# Patient Record
Sex: FEMALE | Race: WHITE | HISPANIC OR LATINO | Employment: UNEMPLOYED | URBAN - METROPOLITAN AREA
[De-identification: names, ages, dates, MRNs, and addresses within clinical notes are randomized per-mention and may not be internally consistent; named-entity substitution may affect disease eponyms.]

---

## 2017-07-19 ENCOUNTER — HOSPITAL ENCOUNTER (EMERGENCY)
Facility: HOSPITAL | Age: 77
Discharge: HOME | End: 2017-07-19
Attending: EMERGENCY MEDICINE

## 2017-07-19 VITALS
DIASTOLIC BLOOD PRESSURE: 66 MMHG | RESPIRATION RATE: 20 BRPM | SYSTOLIC BLOOD PRESSURE: 147 MMHG | HEART RATE: 82 BPM | TEMPERATURE: 98 F | WEIGHT: 82 LBS | BODY MASS INDEX: 15.48 KG/M2 | OXYGEN SATURATION: 98 % | HEIGHT: 61 IN

## 2017-07-19 DIAGNOSIS — R07.9 CHEST PAIN: ICD-10-CM

## 2017-07-19 DIAGNOSIS — K04.01 PULPITIS: ICD-10-CM

## 2017-07-19 DIAGNOSIS — R07.89 NON-CARDIAC CHEST PAIN: ICD-10-CM

## 2017-07-19 DIAGNOSIS — R68.84 JAW PAIN: Primary | ICD-10-CM

## 2017-07-19 LAB
ALBUMIN SERPL BCP-MCNC: 4 G/DL
ALP SERPL-CCNC: 116 U/L
ALT SERPL W/O P-5'-P-CCNC: 9 U/L
ANION GAP SERPL CALC-SCNC: 10 MMOL/L
AST SERPL-CCNC: 13 U/L
BASOPHILS # BLD AUTO: 0.01 K/UL
BASOPHILS NFR BLD: 0.1 %
BILIRUB SERPL-MCNC: 0.3 MG/DL
BNP SERPL-MCNC: 18 PG/ML
BUN SERPL-MCNC: 17 MG/DL
CALCIUM SERPL-MCNC: 9.7 MG/DL
CHLORIDE SERPL-SCNC: 98 MMOL/L
CO2 SERPL-SCNC: 26 MMOL/L
CREAT SERPL-MCNC: 0.8 MG/DL
DIFFERENTIAL METHOD: ABNORMAL
EOSINOPHIL # BLD AUTO: 0 K/UL
EOSINOPHIL NFR BLD: 0.1 %
ERYTHROCYTE [DISTWIDTH] IN BLOOD BY AUTOMATED COUNT: 11.8 %
EST. GFR  (AFRICAN AMERICAN): >60 ML/MIN/1.73 M^2
EST. GFR  (NON AFRICAN AMERICAN): >60 ML/MIN/1.73 M^2
GLUCOSE SERPL-MCNC: 217 MG/DL
GLUCOSE SERPL-MCNC: 222 MG/DL (ref 70–110)
HCT VFR BLD AUTO: 37.3 %
HGB BLD-MCNC: 12.9 G/DL
LYMPHOCYTES # BLD AUTO: 1.3 K/UL
LYMPHOCYTES NFR BLD: 18.7 %
MCH RBC QN AUTO: 30.8 PG
MCHC RBC AUTO-ENTMCNC: 34.6 %
MCV RBC AUTO: 89 FL
MONOCYTES # BLD AUTO: 0.4 K/UL
MONOCYTES NFR BLD: 5.4 %
NEUTROPHILS # BLD AUTO: 5.3 K/UL
NEUTROPHILS NFR BLD: 75.7 %
PLATELET # BLD AUTO: 278 K/UL
PMV BLD AUTO: 10.4 FL
POTASSIUM SERPL-SCNC: 4.1 MMOL/L
PROT SERPL-MCNC: 8.5 G/DL
RBC # BLD AUTO: 4.19 M/UL
SODIUM SERPL-SCNC: 134 MMOL/L
TROPONIN I SERPL DL<=0.01 NG/ML-MCNC: 0.01 NG/ML
TROPONIN I SERPL DL<=0.01 NG/ML-MCNC: <0.006 NG/ML
WBC # BLD AUTO: 7.06 K/UL

## 2017-07-19 PROCEDURE — 93010 ELECTROCARDIOGRAM REPORT: CPT | Mod: ,,, | Performed by: INTERNAL MEDICINE

## 2017-07-19 PROCEDURE — 84484 ASSAY OF TROPONIN QUANT: CPT | Mod: 91

## 2017-07-19 PROCEDURE — 93005 ELECTROCARDIOGRAM TRACING: CPT

## 2017-07-19 PROCEDURE — 83880 ASSAY OF NATRIURETIC PEPTIDE: CPT

## 2017-07-19 PROCEDURE — 99284 EMERGENCY DEPT VISIT MOD MDM: CPT | Mod: 25

## 2017-07-19 PROCEDURE — 96374 THER/PROPH/DIAG INJ IV PUSH: CPT

## 2017-07-19 PROCEDURE — 25000003 PHARM REV CODE 250: Performed by: EMERGENCY MEDICINE

## 2017-07-19 PROCEDURE — 80053 COMPREHEN METABOLIC PANEL: CPT

## 2017-07-19 PROCEDURE — 85025 COMPLETE CBC W/AUTO DIFF WBC: CPT

## 2017-07-19 RX ORDER — GLIPIZIDE 5 MG/1
5 TABLET, FILM COATED, EXTENDED RELEASE ORAL
COMMUNITY

## 2017-07-19 RX ORDER — PENICILLIN V POTASSIUM 500 MG/1
500 TABLET, FILM COATED ORAL 4 TIMES DAILY
Qty: 40 TABLET | Refills: 0 | Status: SHIPPED | OUTPATIENT
Start: 2017-07-19 | End: 2017-07-26

## 2017-07-19 RX ORDER — OXYCODONE AND ACETAMINOPHEN 5; 325 MG/1; MG/1
1 TABLET ORAL
Status: COMPLETED | OUTPATIENT
Start: 2017-07-19 | End: 2017-07-19

## 2017-07-19 RX ORDER — ASPIRIN 325 MG
325 TABLET ORAL
Status: COMPLETED | OUTPATIENT
Start: 2017-07-19 | End: 2017-07-19

## 2017-07-19 RX ORDER — DILTIAZEM HYDROCHLORIDE 90 MG/1
90 TABLET, FILM COATED ORAL 4 TIMES DAILY
COMMUNITY

## 2017-07-19 RX ORDER — DEXTROMETHORPHAN HYDROBROMIDE, GUAIFENESIN 5; 100 MG/5ML; MG/5ML
650 LIQUID ORAL EVERY 12 HOURS PRN
Refills: 0
Start: 2017-07-19 | End: 2017-07-19 | Stop reason: ALTCHOICE

## 2017-07-19 RX ORDER — OXYCODONE AND ACETAMINOPHEN 5; 325 MG/1; MG/1
1 TABLET ORAL EVERY 4 HOURS PRN
Qty: 8 TABLET | Refills: 0 | Status: SHIPPED | OUTPATIENT
Start: 2017-07-19 | End: 2017-07-20

## 2017-07-19 RX ORDER — LABETALOL HYDROCHLORIDE 5 MG/ML
10 INJECTION, SOLUTION INTRAVENOUS
Status: COMPLETED | OUTPATIENT
Start: 2017-07-19 | End: 2017-07-19

## 2017-07-19 RX ORDER — LIDOCAINE HYDROCHLORIDE 20 MG/ML
5 SOLUTION OROPHARYNGEAL
Status: COMPLETED | OUTPATIENT
Start: 2017-07-19 | End: 2017-07-19

## 2017-07-19 RX ORDER — NITROGLYCERIN 0.4 MG/1
0.4 TABLET SUBLINGUAL
Status: DISCONTINUED | OUTPATIENT
Start: 2017-07-19 | End: 2017-07-19 | Stop reason: HOSPADM

## 2017-07-19 RX ADMIN — LABETALOL HYDROCHLORIDE 10 MG: 5 INJECTION, SOLUTION INTRAVENOUS at 12:07

## 2017-07-19 RX ADMIN — ASPIRIN 325 MG ORAL TABLET 325 MG: 325 PILL ORAL at 11:07

## 2017-07-19 RX ADMIN — LIDOCAINE HYDROCHLORIDE 5 ML: 20 SOLUTION ORAL; TOPICAL at 02:07

## 2017-07-19 RX ADMIN — OXYCODONE HYDROCHLORIDE AND ACETAMINOPHEN 1 TABLET: 5; 325 TABLET ORAL at 12:07

## 2017-07-19 NOTE — ED TRIAGE NOTES
"Pt arrived via personal transportation from home. CC of chest pain. Pt stated "I get this pain in the left side of my face, it moves to the back of my head and back to my chest." pt presents with intermittent headaches that radiate to her left chest and shoulder. Pt denies N/V/F/D/SOB. NAD at this time.  "

## 2017-07-19 NOTE — ED PROVIDER NOTES
Encounter Date: 7/19/2017    SCRIBE #1 NOTE: I, Nathan Cole, am scribing for, and in the presence of,  José Miguel Hillman MD. I have scribed the following portions of the note - Other sections scribed: HPI and ROS.       History     Chief Complaint   Patient presents with    Chest Pain     Pt. presents with chest pain that began yesterday. Pt. describes the pain as beginning to left side of chest and radiating up the left side of neck and to jaw.      CC: Jaw Pain     HPI: This 77 y.o F with DM and HTN presents to the ED c/o acute onset of intermittent L side lower jaw pain which radiates up x1 day. The pt's episodes of pain last for a few seconds. The pt was seen by a dentist yesterday, and was told her exam was normal. The pt reports similar episodes 8 years ago, which was attributed to nerve pain. The the pt denies cardiac hx, diaphoresis, chest pain, fever and chills. No prior tx. Reports jaw pain with radiate down face to chest and upper to her left temple. Pain is sharp.       The history is provided by the patient. No  was used.     Review of patient's allergies indicates:  No Known Allergies  Past Medical History:   Diagnosis Date    Diabetes mellitus     Hypertension      History reviewed. No pertinent surgical history.  History reviewed. No pertinent family history.  Social History   Substance Use Topics    Smoking status: Never Smoker    Smokeless tobacco: Never Used    Alcohol use No     Review of Systems   Constitutional: Negative for chills, diaphoresis and fever.   HENT: Negative for hearing loss, sore throat and tinnitus.         (+) L side jaw pain   Eyes: Negative for pain and redness.   Respiratory: Negative for shortness of breath.    Cardiovascular: Negative for chest pain.   Gastrointestinal: Negative for nausea.   Genitourinary: Negative for dysuria, hematuria and urgency.   Musculoskeletal: Negative for back pain and neck pain.   Skin: Negative for rash.    Neurological: Positive for headaches. Negative for weakness.   Hematological: Does not bruise/bleed easily.       Physical Exam     Initial Vitals [07/19/17 0947]   BP Pulse Resp Temp SpO2   (!) 170/70 103 17 99 °F (37.2 °C) 98 %      MAP       103.33         Physical Exam    Vitals reviewed.  Constitutional: She appears well-developed and well-nourished.   Will occasionally grab her face and complain of momentary jaw pain and hold her left jaw.    HENT:   Head: Normocephalic and atraumatic.   Nose: Nose normal.   Mouth/Throat: Uvula is midline and oropharynx is clear and moist. She does not have dentures. No oral lesions. No trismus in the jaw. Abnormal dentition. Dental caries present. No dental abscesses or uvula swelling. No oropharyngeal exudate.       Eyes: EOM are normal. Pupils are equal, round, and reactive to light.   Neck: Normal range of motion. Neck supple. No JVD present.   Cardiovascular: Regular rhythm and normal heart sounds. Exam reveals no gallop and no friction rub.    No murmur heard.  Pulmonary/Chest: Breath sounds normal. No stridor. No respiratory distress. She has no wheezes. She has no rhonchi. She has no rales.   Abdominal: Soft. Bowel sounds are normal. She exhibits no distension and no mass. There is no tenderness. There is no rebound and no guarding.   Musculoskeletal: Normal range of motion. She exhibits no edema or tenderness.   Neurological: She is alert and oriented to person, place, and time. She has normal strength. No sensory deficit.   Skin: Skin is warm and dry.   Psychiatric: She has a normal mood and affect. Thought content normal.         ED Course   Procedures  Labs Reviewed   CBC W/ AUTO DIFFERENTIAL - Abnormal; Notable for the following:        Result Value    Gran% 75.7 (*)     All other components within normal limits   COMPREHENSIVE METABOLIC PANEL - Abnormal; Notable for the following:     Sodium 134 (*)     Glucose 217 (*)     Total Protein 8.5 (*)     ALT 9 (*)      All other components within normal limits   TROPONIN I   B-TYPE NATRIURETIC PEPTIDE   TROPONIN I     EKG Readings: (Independently Interpreted)   Initial Reading: No STEMI. Rhythm: Sinus Tachycardia. Ectopy: No Ectopy. Conduction: Normal. ST Segments: Normal ST Segments. T Waves: Normal. Clinical Impression: Normal Sinus Rhythm       X-Rays:   Independently Interpreted Readings:   Chest X-Ray: Normal heart size.  No infiltrates.  No acute abnormalities.          Medical decision-making:    The patient received a medical screening exam. If performed, the EKG was independently evaluated by me and is pending final cardiology evaluation.  If performed, all radiographic studies were independently evaluated by me and are pending final radiology evaluation. If labs were ordered, they were reviewed. Vital signs are independently assessed by me.  If performed, the pulse oximetry was independently evaluated by me.  I decided to obtain the patient's past medical record.  If available, I reviewed the patient's past medical record, including most recent labs and radiology reports.    This is an emergent evaluation for a patient with jaw pain that sometimes goes down into the chest..The patient's pain is atypical for cardiac etiology.  I decided to obtain and review the patient's past medical record.      Pt has very poor dentition. Pt likely has acute pulpitis. There is severe erosion, dental caries and missing teeth.  There is no sign of odontogenic abscess or fluctuant mass.  Patient is able to fully range her jaw.  Doubt septic jaw.  Doubt deep space infection.  No sign of epiglottitis or strep pharyngitis.  No tracheitis.  I will have the patient follow-up with dental clinic.  We'll start on antibiotics and pain medications.    The vital signs are stable in the room.    EKG is normal.  There is no evidence of STEMI or ischemia.    CXR is negative for pneumonia, pneumothorax and edema.  Troponin is negative x 2 and was  drawn at least 8 hours since the onset of pain.  I doubt ACS.  BNP is negative.  There is no evidence of congestive heart failure.  The electrolytes are relatively normal.  The pt is not anemic.      The pt's symptoms were treated with:    Medications   aspirin tablet 325 mg (325 mg Oral Given 7/19/17 1103)   oxycodone-acetaminophen 5-325 mg per tablet 1 tablet (1 tablet Oral Given 7/19/17 1216)   labetalol injection 10 mg (10 mg Intravenous Given 7/19/17 1240)   lidocaine HCl 2% oral solution 5 mL (5 mLs Oral Given 7/19/17 1435)       Currently the patient has a a non-diagnostic EKG with negative troponin in the emergency department.  I doubt acute coronary syndrome.  I did inform the patient that even with negative testing, we can never eliminate all risk.  I believe the patient is low risk with negative initial testing; they are appropriate for close outpatient follow-up.  The patient is aware of the small but persistent risk for MI/ACS with subsequent cardiac complications or death.  I have low suspicion for cardiopulmonary, vascular, infectious, respiratory, or other emergent medical condition based on my evaluation in the ED.     The patient's pain is currently improved.      The results and physical exam findings were reviewed with the patient. Pt agrees with assessment, disposition and treatment plan and has no further questions or complaints at this time.      LAURA Hillman M.D. 7:13 PM 7/19/2017         Scribe Attestation:   Scribe #1: I performed the above scribed service and the documentation accurately describes the services I performed. I attest to the accuracy of the note.    Attending Attestation:           Physician Attestation for Scribe:  Physician Attestation Statement for Scribe #1: I, José Miguel Plata MD, reviewed documentation, as scribed by Nathan Cole in my presence, and it is both accurate and complete.                 ED Course     Clinical Impression:   The primary encounter  diagnosis was Jaw pain. Diagnoses of Chest pain, Pulpitis, and Non-cardiac chest pain were also pertinent to this visit.                           José Miguel Hillman MD  07/19/17 4473

## 2017-07-20 ENCOUNTER — HOSPITAL ENCOUNTER (EMERGENCY)
Facility: HOSPITAL | Age: 77
Discharge: HOME | End: 2017-07-20
Attending: EMERGENCY MEDICINE

## 2017-07-20 VITALS
HEART RATE: 100 BPM | SYSTOLIC BLOOD PRESSURE: 170 MMHG | OXYGEN SATURATION: 97 % | WEIGHT: 83 LBS | DIASTOLIC BLOOD PRESSURE: 80 MMHG | TEMPERATURE: 99 F | BODY MASS INDEX: 16.73 KG/M2 | RESPIRATION RATE: 18 BRPM | HEIGHT: 59 IN

## 2017-07-20 DIAGNOSIS — G51.8 FACIAL NEURALGIA: Primary | ICD-10-CM

## 2017-07-20 PROCEDURE — 99283 EMERGENCY DEPT VISIT LOW MDM: CPT

## 2017-07-20 RX ORDER — PREGABALIN 75 MG/1
75 CAPSULE ORAL 2 TIMES DAILY
Qty: 20 CAPSULE | Refills: 0 | Status: SHIPPED | OUTPATIENT
Start: 2017-07-20 | End: 2017-07-30

## 2017-07-20 NOTE — ED PROVIDER NOTES
Encounter Date: 7/20/2017       History     Chief Complaint   Patient presents with    Facial Pain     Left side, x2 days. Family reports patient was evaluated here yesterday and given antibiotics and pain medicaiton w/ no relief. Pt was evaluated by a dentist yesterday and told it was not a tooth. Family reports the patient had the same symptoms years ago and it was nerve pain.    Chief complaint: Facial pain  History of present illness: Patient reports 2 days of left-sided facial pain that is intermittent and shocking in character she states that nothing helps states that the pain does not radiate and is a 10 over 10 she states that putting pressure on the pain decreases pain slightly is in intense pain and hurts worse when she opens her mouth even slightly.  She had the pain 8 years previously and was told than it was a nerve in the face that made the pain happened.        The history is provided by the patient and a relative. The history is limited by a language barrier. A  was used.     Review of patient's allergies indicates:  No Known Allergies  Past Medical History:   Diagnosis Date    Diabetes mellitus     Hypertension      History reviewed. No pertinent surgical history.  History reviewed. No pertinent family history.  Social History   Substance Use Topics    Smoking status: Never Smoker    Smokeless tobacco: Never Used    Alcohol use No     Review of Systems   Constitutional: Negative for chills, fatigue and fever.   HENT: Negative for congestion, ear discharge, ear pain, postnasal drip, rhinorrhea, sinus pressure, sneezing, sore throat and voice change.    Eyes: Negative for discharge and itching.   Respiratory: Negative for cough, shortness of breath and wheezing.    Cardiovascular: Negative for chest pain, palpitations and leg swelling.   Gastrointestinal: Negative for abdominal pain, constipation, diarrhea, nausea and vomiting.   Endocrine: Negative for polydipsia, polyphagia  and polyuria.   Genitourinary: Negative for dysuria, frequency, hematuria, urgency, vaginal bleeding, vaginal discharge and vaginal pain.   Musculoskeletal: Negative for arthralgias and myalgias.   Skin: Negative for rash and wound.   Neurological: Negative for dizziness, seizures, syncope, weakness and numbness.   Hematological: Negative for adenopathy. Does not bruise/bleed easily.   Psychiatric/Behavioral: Negative for self-injury and suicidal ideas. The patient is not nervous/anxious.        Physical Exam     Initial Vitals [07/20/17 1720]   BP Pulse Resp Temp SpO2   (!) 170/80 100 18 98.8 °F (37.1 °C) 97 %      MAP       110         Physical Exam    Nursing note and vitals reviewed.  Constitutional: She appears well-developed and well-nourished.   HENT:   Head: Normocephalic and atraumatic.   Right Ear: External ear normal.   Left Ear: External ear normal.   Nose: Nose normal.   Eyes: Conjunctivae and EOM are normal. Pupils are equal, round, and reactive to light. Right eye exhibits no discharge. Left eye exhibits no discharge.   Neck: Normal range of motion.   Abdominal: She exhibits no distension.   Musculoskeletal: Normal range of motion.   Neurological: She is alert and oriented to person, place, and time. She has normal strength. No cranial nerve deficit or sensory deficit. GCS eye subscore is 4. GCS verbal subscore is 5. GCS motor subscore is 6.   Skin: Skin is dry. Capillary refill takes less than 2 seconds.         ED Course   Procedures  Labs Reviewed - No data to display          Medical Decision Making:   Initial Assessment:   77-year-old  female visiting with a 2 day history of left-sided neurogenic pain  Differential Diagnosis:   Trigeminal neuralgia, facial neuralgia, dental pain  ED Management:  Following a thorough history and physical, the patient was diagnosed with neuralgia discharged home with Lyrica 75 mg twice a day for neurogenic pain and told to follow-up with her primary care  provider who will continue this prescription if it proves ineffective.  If it does not her primary care provider can prescribe Tegretol and follow up with her for carry through on that prescription.  Other:   I have discussed this case with another health care provider.       <> Summary of the Discussion: Care of the patient discussed with Dr. Navarro who agreed with the assessment and plan.                       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    Disposition:   Disposition: Discharged  Condition: Stable                        Ward Keller, JERICA  07/20/17 1846

## 2017-07-20 NOTE — DISCHARGE INSTRUCTIONS
Follow up with your doctor asap to continue your new medication if it helps, consider a neurology consult.  Return to the Emergency department for any worsening or failure to improve, otherwise follow up with your primary care provider.

## 2017-07-20 NOTE — ED TRIAGE NOTES
Left sided facial pain for 2 days seen here yesterday  Also saw dentist yesterday  Still having discomfort

## 2017-07-22 LAB — POCT GLUCOSE: 222 MG/DL (ref 70–110)

## 2022-08-11 ENCOUNTER — OFFICE VISIT (OUTPATIENT)
Dept: URGENT CARE | Facility: CLINIC | Age: 82
End: 2022-08-11

## 2022-08-11 VITALS
DIASTOLIC BLOOD PRESSURE: 67 MMHG | SYSTOLIC BLOOD PRESSURE: 166 MMHG | BODY MASS INDEX: 14.72 KG/M2 | HEART RATE: 104 BPM | WEIGHT: 73 LBS | HEIGHT: 59 IN | OXYGEN SATURATION: 95 % | TEMPERATURE: 99 F | RESPIRATION RATE: 18 BRPM

## 2022-08-11 DIAGNOSIS — M85.80 OSTEOPENIA, UNSPECIFIED LOCATION: ICD-10-CM

## 2022-08-11 DIAGNOSIS — R81 GLUCOSURIA: ICD-10-CM

## 2022-08-11 DIAGNOSIS — Z76.89 ENCOUNTER TO ESTABLISH CARE: ICD-10-CM

## 2022-08-11 DIAGNOSIS — M54.9 BACK PAIN, UNSPECIFIED BACK LOCATION, UNSPECIFIED BACK PAIN LATERALITY, UNSPECIFIED CHRONICITY: ICD-10-CM

## 2022-08-11 DIAGNOSIS — M54.50 ACUTE BILATERAL LOW BACK PAIN WITHOUT SCIATICA: Primary | ICD-10-CM

## 2022-08-11 LAB
BILIRUB UR QL STRIP: NEGATIVE
GLUCOSE SERPL-MCNC: 265 MG/DL (ref 70–110)
GLUCOSE UR QL STRIP: POSITIVE
KETONES UR QL STRIP: NEGATIVE
LEUKOCYTE ESTERASE UR QL STRIP: NEGATIVE
PH, POC UA: 5
POC BLOOD, URINE: NEGATIVE
POC NITRATES, URINE: NEGATIVE
PROT UR QL STRIP: NEGATIVE
SP GR UR STRIP: 1.02 (ref 1–1.03)
UROBILINOGEN UR STRIP-ACNC: ABNORMAL (ref 0.1–1.1)

## 2022-08-11 PROCEDURE — 74019 RADEX ABDOMEN 2 VIEWS: CPT | Mod: TIER,S$GLB,, | Performed by: RADIOLOGY

## 2022-08-11 PROCEDURE — 81003 URINALYSIS AUTO W/O SCOPE: CPT | Mod: QW,TIER,S$GLB, | Performed by: FAMILY MEDICINE

## 2022-08-11 PROCEDURE — 82962 POCT GLUCOSE, HAND-HELD DEVICE: ICD-10-PCS | Mod: TIER,S$GLB,, | Performed by: FAMILY MEDICINE

## 2022-08-11 PROCEDURE — 81003 POCT URINALYSIS, DIPSTICK, AUTOMATED, W/O SCOPE: ICD-10-PCS | Mod: QW,TIER,S$GLB, | Performed by: FAMILY MEDICINE

## 2022-08-11 PROCEDURE — 74019 XR ABDOMEN FLAT AND ERECT: ICD-10-PCS | Mod: TIER,S$GLB,, | Performed by: RADIOLOGY

## 2022-08-11 PROCEDURE — 99204 PR OFFICE/OUTPT VISIT, NEW, LEVL IV, 45-59 MIN: ICD-10-PCS | Mod: TIER,S$GLB,, | Performed by: FAMILY MEDICINE

## 2022-08-11 PROCEDURE — 82962 GLUCOSE BLOOD TEST: CPT | Mod: TIER,S$GLB,, | Performed by: FAMILY MEDICINE

## 2022-08-11 PROCEDURE — 72100 X-RAY EXAM L-S SPINE 2/3 VWS: CPT | Mod: TIER,S$GLB,, | Performed by: RADIOLOGY

## 2022-08-11 PROCEDURE — 72100 XR LUMBAR SPINE 2 OR 3 VIEWS: ICD-10-PCS | Mod: TIER,S$GLB,, | Performed by: RADIOLOGY

## 2022-08-11 PROCEDURE — 99204 OFFICE O/P NEW MOD 45 MIN: CPT | Mod: TIER,S$GLB,, | Performed by: FAMILY MEDICINE

## 2022-08-11 RX ORDER — LIDOCAINE 50 MG/G
1 PATCH TOPICAL DAILY
Qty: 7 PATCH | Refills: 0 | Status: SHIPPED | OUTPATIENT
Start: 2022-08-11 | End: 2022-08-18

## 2022-08-11 RX ORDER — DICLOFENAC SODIUM 10 MG/G
2 GEL TOPICAL 4 TIMES DAILY
Qty: 20 G | Refills: 0 | Status: SHIPPED | OUTPATIENT
Start: 2022-08-11

## 2022-08-11 NOTE — PROGRESS NOTES
"Subjective:       Patient ID: Jing Velasquez is a 82 y.o. female.    Vitals:  height is 4' 11" (1.499 m) and weight is 33.1 kg (73 lb). Her temperature is 99 °F (37.2 °C). Her blood pressure is 166/67 (abnormal) and her pulse is 104. Her respiration is 18 and oxygen saturation is 95%.     Chief Complaint: Back Pain    Pt is coming in today with lower back pain. Pain started Tuesday, unchanged.Pt caregiver states she only has been when she is moving. Pain level 10. Ibuprofen taken with mild relief.   Provider note begins below:  Language line used = [id number]  Pt with her daughter, daughter reports she gets seen by different doctors, she is currently living in [facility] with her daughter, daughter is unsure if she has a pcp, she does not have health insurance. Hx of umbilical hernia repair.  Caregiver reports she has a history of cystitis but denies any frequency, urgency, dysuria at this time.  She has more pain with movement, has comfort lying flat.  Denies injury or trauma.  Denies history of back problems in the past, does have a history of cholecystectomy.  Reports she is passing gas, last bowel movement yesterday and was normal for her.  Daughter reports she has a history of hypertension, hyper lipidemia, and diabetes.    Back Pain  This is a new problem. Episode onset: 3 days  The problem occurs constantly. The problem is unchanged. The pain is present in the gluteal. The quality of the pain is described as aching. The pain does not radiate. The pain is at a severity of 10/10. The pain is severe. The pain is the same all the time. The symptoms are aggravated by twisting, bending and position. Pertinent negatives include no abdominal pain, bladder incontinence, bowel incontinence, chest pain, dysuria, fever, headaches, leg pain, numbness, paresis, paresthesias, pelvic pain, perianal numbness, tingling, weakness or weight loss. She has tried NSAIDs for the symptoms. The treatment provided no relief. "       Constitution: Negative for activity change, appetite change, chills, sweating, fatigue, fever, unexpected weight change and generalized weakness.   Cardiovascular: Negative for chest pain.   Gastrointestinal: Negative for abdominal pain and bowel incontinence.   Genitourinary: Negative for dysuria, frequency, urgency, urine decreased, bladder incontinence and pelvic pain.   Musculoskeletal: Positive for pain and back pain. Negative for trauma, joint pain, joint swelling, abnormal ROM of joint, arthritis, gout, muscle cramps, muscle ache and history of spine disorder.   Neurological: Negative for headaches and numbness.       Objective:      Physical Exam   Constitutional: She is oriented to person, place, and time. She appears well-developed.  Non-toxic appearance. She does not appear ill. No distress.      Comments:Daughter present patient is very thin and elderly appearing.     HENT:   Head: Normocephalic and atraumatic.   Ears:   Right Ear: External ear normal.   Left Ear: External ear normal.   Nose: Nose normal.   Mouth/Throat: Oropharynx is clear and moist.   Eyes: Conjunctivae, EOM and lids are normal. Pupils are equal, round, and reactive to light.   Neck: Trachea normal and phonation normal. Neck supple.   Cardiovascular: S1 normal and S2 normal.   Pulmonary/Chest: Effort normal and breath sounds normal.   Abdominal: Soft. flat abdomen There is generalized abdominal tenderness. There is no rebound, no guarding, no tenderness at McBurney's point, negative Adams's sign, no left CVA tenderness, negative Rovsing's sign, negative psoas sign, no right CVA tenderness and negative obturator sign.   Musculoskeletal:      Lumbar back: She exhibits decreased range of motion. She exhibits no tenderness, no bony tenderness, no swelling, no edema, no deformity, no laceration and no spasm.      Comments: There is no midline spinal tenderness, she has decreased range of motion, pain with rotation, and rib and  flexion, needs assistance for ROM, needs assistance with gait, daughter helping her ambulate.    Neurological: She is alert and oriented to person, place, and time.   Skin: Skin is warm, dry, intact, not diaphoretic and no rash.   Psychiatric: Her speech is normal and behavior is normal. Judgment and thought content normal.   Nursing note and vitals reviewed.        Assessment:       1. Acute bilateral low back pain without sciatica    2. Back pain, unspecified back location, unspecified back pain laterality, unspecified chronicity    3. Glucosuria    4. Osteopenia, unspecified location    5. Encounter to establish care        Results for orders placed or performed in visit on 08/11/22   POCT Urinalysis, Dipstick, Automated, W/O Scope   Result Value Ref Range    POC Blood, Urine Negative Negative    POC Bilirubin, Urine Negative Negative    POC Urobilinogen, Urine norm 0.1 - 1.1    POC Ketones, Urine Negative Negative    POC Protein, Urine Negative Negative    POC Nitrates, Urine Negative Negative    POC Glucose, Urine Positive (A) Negative    pH, UA 5.0     POC Specific Gravity, Urine 1.020 1.003 - 1.029    POC Leukocytes, Urine Negative Negative   POCT Glucose, Hand-Held Device   Result Value Ref Range    POC Glucose 265 (A) 70 - 110 MG/DL      XR ABDOMEN FLAT AND ERECT    Result Date: 8/11/2022  EXAMINATION: XR ABDOMEN FLAT AND ERECT CLINICAL HISTORY: Low back pain, unspecified TECHNIQUE: Flat and erect AP views of the abdomen were performed. COMPARISON: None FINDINGS: Opacities at the lung bases may relate to scarring.  Left lower lung zone opacity may relate to nipple shadow.  No large volume of free air or radiographic evidence of bowel obstruction.  Stool throughout the colon may relate to constipation.  There are surgical clips in the gallbladder fossa and postop changes of the left femur.  There is an SMA stent.  There are aortoiliac calcifications.  Bones show degenerative changes.     No acute  radiographic findings in the abdomen.  Possible constipation. Bilateral lower lung zone opacities could relate to scarring.  Further evaluation could be obtained with dedicated radiograph of the chest. Electronically signed by: Jake Paulson MD Date:    08/11/2022 Time:    14:54    XR LUMBAR SPINE 2 OR 3 VIEWS    Result Date: 8/11/2022  EXAMINATION: XR LUMBAR SPINE 2 OR 3 VIEWS CLINICAL HISTORY: Low back pain, unspecified TECHNIQUE: Three views including AP, lateral and spot lateral view of the lumbosacral junction COMPARISON: None FINDINGS: Generalized osteopenia.  Mild curvature of the lumbar spine on the frontal view, convex toward the right.  Alignment appears satisfactory in the lateral projection.  Vertebral body heights and disc spaces in the lumbar region appear satisfactory.  There may be some anterior wedging of the T11 and T12 vertebral bodies.  Mild hypertrophic degenerative changes are noted.  No spondylolysis or spondylolisthesis.  No definite evidence of acute fracture or osseous destruction.  Pedicles appear intact.  Aortoiliac atherosclerosis without definite evidence of aneurysm.  Metallic stent seen in the region of the SMA.  Surgical clips at the right upper quadrant of the abdomen.     Generalized osteopenia with DJD and alignment abnormalities as detailed above.  Possible anterior wedging of the T11-T12 vertebral bodies.  Additional findings as detailed above. Electronically signed by: Ilan Gomez MD Date:    08/11/2022 Time:    14:34    3:20 PM used language line #[id number] for update and poc  She has a fu with GI 8/25 and fu with ortho upcoming as well  Sounds like she came in town from [place], she was having pain after eating and is being followed by GI for this.  Not interested in ED eval at this time.   Plan:       encouraged vitamin-D and calcium  Topical therapy, warm compress    Discussed results/diagnosis/plan with patient in clinic. Strict precautions given to patient to monitor  for worsening signs and symptoms. Advised to follow up with PCP or specialist.    Explained side effects of medications prescribed with patient and informed him/her to discontinue use if he/she has any side effects and to inform UC or PCP if this occurs. All questions answered. Strict ED verses clinic return precautions stressed and given in depth. Advised if symptoms worsens of fail to improve he/she should go to the Emergency Room. Discharge and follow-up instructions given verbally/printed with the patient who expressed understanding and willingness to comply with my recommendations. Patient voiced understanding and in agreement with current treatment plan. Patient exits the exam room in no acute distress. Conversant and engaged during discharge discussion, verbalized understanding.      Acute bilateral low back pain without sciatica  -     XR LUMBAR SPINE 2 OR 3 VIEWS; Future; Expected date: 08/11/2022  -     XR ABDOMEN FLAT AND ERECT; Future; Expected date: 08/11/2022  -     LIDOcaine (LIDODERM) 5 %; Place 1 patch onto the skin once daily. Remove & Discard patch within 12 hours or as directed by MD for 7 days  Dispense: 7 patch; Refill: 0  -     diclofenac sodium (VOLTAREN) 1 % Gel; Apply 2 g topically 4 (four) times daily.  Dispense: 20 g; Refill: 0    Back pain, unspecified back location, unspecified back pain laterality, unspecified chronicity  -     POCT Urinalysis, Dipstick, Automated, W/O Scope    Glucosuria  -     POCT Glucose, Hand-Held Device    Osteopenia, unspecified location    Encounter to establish care  -     Ambulatory referral/consult to [facility]                 Patient Instructions   General Discharge Instructions   PLEASE READ YOUR DISCHARGE INSTRUCTIONS ENTIRELY AS IT CONTAINS IMPORTANT INFORMATION.  If you were prescribed a narcotic or controlled medication, do not drive or operate heavy equipment or machinery while taking these medications.  If you were prescribed antibiotics, please  take them to completion.  You must understand that you've received an Urgent Care treatment only and that you may be released before all your medical problems are known or treated. You, the patient, will arrange for follow up care as instructed.    OVER THE COUNTER RECOMMENDATIONS/SUGGESTIONS.    Make sure to stay well hydrated.    Use Nasal Saline to mechanically move any post nasal drip from your eustachian tube or from the back of your throat.    Use warm salt water gargles to ease your throat pain. Warm salt water gargles as needed for sore throat- 1/2 tsp salt to 1 cup warm water, gargle as desired.    Use an antihistamine such as Claritin, Zyrtec or Allegra to dry you out.    Use pseudoephedrine (behind the counter) to decongest. Pseudoephedrine 30 mg up to 240 mg /day. It can raise your blood pressure and give you palpitations.    Use mucinex (guaifenesin) to break up mucous up to 2400mg/day to loosen any mucous.    The mucinex DM pill has a cough suppressant that can be sedating. It can be used at night to stop the tickle at the back of your throat.    You can use Mucinex D (it has guaifenesin and a high dose of pseudoephedrine) in the mornings to help decongest.    Use Afrin in each nare for no longer than 3 days, as it is addictive. It can also dry out your mucous membranes and cause elevated blood pressure. This is especially useful if you are flying.    Use Flonase 1-2 sprays/nostril per day. It is a local acting steroid nasal spray, if you develop a bloody nose, stop using the medication immediately.    Sometimes Nyquil at night is beneficial to help you get some rest, however it is sedating and it does have an antihistamine, and tylenol.    Honey is a natural cough suppressant that can be used.    Tylenol up to 4,000 mg a day is safe for short periods and can be used for body aches, pain, and fever. However in high doses and prolonged use it can cause liver irritation.    Ibuprofen is a non-steroidal  anti-inflammatory that can be used for body aches, pain, and fever.However it can also cause stomach irritation if over used.     Follow up with your PCP or specialty clinic as instructed in the next 2-3 days if not improved or as needed. You can call [phone] to schedule an appointment with appropriate provider.      If you condition worsens, we recommend that you receive another evaluation at the emergency room immediately or contact your primary medical clinic's after hours call service to discuss your concerns.      Please return here or go to the Emergency Department for any concerns or worsening condition.   You can also call [phone] to schedule an appointment with the appropriate provider.    Please return here or go to the Emergency Department for any concerns or worsening of condition.    Thank you for choosing [facility]!    Our goal in the Urgent Care is to always provide outstanding medical care. You may receive a survey by mail or e-mail in the next week regarding your experience today. We would greatly appreciate you completing and returning the survey. Your feedback provides us with a way to recognize our staff who provide very good care, and it helps us learn how to improve when your experience was below our aspiration of excellence.      We appreciate you trusting us with your medical care. We hope you feel better soon. We will be happy to take care of you for all of your future medical needs.    Sincerely,    QI Locke-JIM      Back Pain Discharge Instructions    Alternate heat and ice for first 48 hours then  apply heat. You may do gently stretching if tolerable.    Moist warm compresss to area several times daily.  May use a heating pad on LOW to provide heat over a towel which was dampended with warm water. DO NOT FALL ASLEEP WITH HEATING PAD ON.  Do not stay in one position to long.  When sleeping on your back place a pillow under knees to reduce tension on back.   If sleeping on your side, place pillow between knees to keep spine in better alinement.  Wear supportive shoes such as tennis shoes for support of the lower back.  Take any medication as directed.    If you were not prescribed an anti-inflammatory medication, and if you do not have any history of stomach/intestinal ulcers, or kidney disease, or are not taking a blood thinner such as Coumadin, Plavix, Pradaxa, Eloquis, or Xaralta for example, it is OK to take over the counter Ibuprofen or Advil or Motrin or Aleve as directed.  Do not take these medications on an empty stomach.    If you were prescribed a narcotic medication, do not drive or operate heavy equipment or machinery while taking these medications.    If you lose control of your bowel and/or bladder; lose sensation in between your legs by your genitalia and/or rectum or  lose control or sensation of any extremity, please go to the nearest Emergency Department immediately.

## 2022-08-11 NOTE — PATIENT INSTRUCTIONS
General Discharge Instructions   PLEASE READ YOUR DISCHARGE INSTRUCTIONS ENTIRELY AS IT CONTAINS IMPORTANT INFORMATION.  If you were prescribed a narcotic or controlled medication, do not drive or operate heavy equipment or machinery while taking these medications.  If you were prescribed antibiotics, please take them to completion.  You must understand that you've received an Urgent Care treatment only and that you may be released before all your medical problems are known or treated. You, the patient, will arrange for follow up care as instructed.    OVER THE COUNTER RECOMMENDATIONS/SUGGESTIONS.    Make sure to stay well hydrated.    Use Nasal Saline to mechanically move any post nasal drip from your eustachian tube or from the back of your throat.    Use warm salt water gargles to ease your throat pain. Warm salt water gargles as needed for sore throat- 1/2 tsp salt to 1 cup warm water, gargle as desired.    Use an antihistamine such as Claritin, Zyrtec or Allegra to dry you out.    Use pseudoephedrine (behind the counter) to decongest. Pseudoephedrine 30 mg up to 240 mg /day. It can raise your blood pressure and give you palpitations.    Use mucinex (guaifenesin) to break up mucous up to 2400mg/day to loosen any mucous.    The mucinex DM pill has a cough suppressant that can be sedating. It can be used at night to stop the tickle at the back of your throat.    You can use Mucinex D (it has guaifenesin and a high dose of pseudoephedrine) in the mornings to help decongest.    Use Afrin in each nare for no longer than 3 days, as it is addictive. It can also dry out your mucous membranes and cause elevated blood pressure. This is especially useful if you are flying.    Use Flonase 1-2 sprays/nostril per day. It is a local acting steroid nasal spray, if you develop a bloody nose, stop using the medication immediately.    Sometimes Nyquil at night is beneficial to help you get some rest, however it is sedating and it  does have an antihistamine, and tylenol.    Honey is a natural cough suppressant that can be used.    Tylenol up to 4,000 mg a day is safe for short periods and can be used for body aches, pain, and fever. However in high doses and prolonged use it can cause liver irritation.    Ibuprofen is a non-steroidal anti-inflammatory that can be used for body aches, pain, and fever.However it can also cause stomach irritation if over used.     Follow up with your PCP or specialty clinic as instructed in the next 2-3 days if not improved or as needed. You can call [phone] to schedule an appointment with appropriate provider.      If you condition worsens, we recommend that you receive another evaluation at the emergency room immediately or contact your primary medical clinic's after hours call service to discuss your concerns.      Please return here or go to the Emergency Department for any concerns or worsening condition.   You can also call [phone] to schedule an appointment with the appropriate provider.    Please return here or go to the Emergency Department for any concerns or worsening of condition.    Thank you for choosing [facility]!    Our goal in the Urgent Care is to always provide outstanding medical care. You may receive a survey by mail or e-mail in the next week regarding your experience today. We would greatly appreciate you completing and returning the survey. Your feedback provides us with a way to recognize our staff who provide very good care, and it helps us learn how to improve when your experience was below our aspiration of excellence.      We appreciate you trusting us with your medical care. We hope you feel better soon. We will be happy to take care of you for all of your future medical needs.    Sincerely,    QI Locke-JIM      Back Pain Discharge Instructions    Alternate heat and ice for first 48 hours then  apply heat. You may do gently stretching if  tolerable.    Moist warm compresss to area several times daily.  May use a heating pad on LOW to provide heat over a towel which was dampended with warm water. DO NOT FALL ASLEEP WITH HEATING PAD ON.  Do not stay in one position to long.  When sleeping on your back place a pillow under knees to reduce tension on back.  If sleeping on your side, place pillow between knees to keep spine in better alinement.  Wear supportive shoes such as tennis shoes for support of the lower back.  Take any medication as directed.    If you were not prescribed an anti-inflammatory medication, and if you do not have any history of stomach/intestinal ulcers, or kidney disease, or are not taking a blood thinner such as Coumadin, Plavix, Pradaxa, Eloquis, or Xaralta for example, it is OK to take over the counter Ibuprofen or Advil or Motrin or Aleve as directed.  Do not take these medications on an empty stomach.    If you were prescribed a narcotic medication, do not drive or operate heavy equipment or machinery while taking these medications.    If you lose control of your bowel and/or bladder; lose sensation in between your legs by your genitalia and/or rectum or  lose control or sensation of any extremity, please go to the nearest Emergency Department immediately.

## 2024-03-02 ENCOUNTER — HOSPITAL ENCOUNTER (INPATIENT)
Facility: HOSPITAL | Age: 84
LOS: 2 days | Discharge: HOME | DRG: 299 | End: 2024-03-04
Attending: STUDENT IN AN ORGANIZED HEALTH CARE EDUCATION/TRAINING PROGRAM | Admitting: STUDENT IN AN ORGANIZED HEALTH CARE EDUCATION/TRAINING PROGRAM
Payer: MEDICAID

## 2024-03-02 DIAGNOSIS — R07.9 CHEST PAIN: ICD-10-CM

## 2024-03-02 DIAGNOSIS — J18.9 PNEUMONIA OF RIGHT LUNG DUE TO INFECTIOUS ORGANISM, UNSPECIFIED PART OF LUNG: Primary | ICD-10-CM

## 2024-03-02 DIAGNOSIS — I73.9 PERIPHERAL ARTERIAL DISEASE: ICD-10-CM

## 2024-03-02 DIAGNOSIS — R10.9 ABDOMINAL PAIN, UNSPECIFIED ABDOMINAL LOCATION: ICD-10-CM

## 2024-03-02 LAB
ALBUMIN SERPL BCP-MCNC: 3.1 G/DL (ref 3.5–5.2)
ALLENS TEST: ABNORMAL
ALLENS TEST: NORMAL
ALP SERPL-CCNC: 115 U/L (ref 55–135)
ALT SERPL W/O P-5'-P-CCNC: 20 U/L (ref 10–44)
ANION GAP SERPL CALC-SCNC: 11 MMOL/L (ref 8–16)
AST SERPL-CCNC: 17 U/L (ref 10–40)
BASOPHILS # BLD AUTO: 0.01 K/UL (ref 0–0.2)
BASOPHILS NFR BLD: 0.2 % (ref 0–1.9)
BILIRUB SERPL-MCNC: 0.1 MG/DL (ref 0.1–1)
BUN SERPL-MCNC: 32 MG/DL (ref 8–23)
CALCIUM SERPL-MCNC: 9.4 MG/DL (ref 8.7–10.5)
CHLORIDE SERPL-SCNC: 95 MMOL/L (ref 95–110)
CO2 SERPL-SCNC: 26 MMOL/L (ref 23–29)
CREAT SERPL-MCNC: 0.8 MG/DL (ref 0.5–1.4)
DIFFERENTIAL METHOD BLD: ABNORMAL
EOSINOPHIL # BLD AUTO: 0.1 K/UL (ref 0–0.5)
EOSINOPHIL NFR BLD: 1.4 % (ref 0–8)
ERYTHROCYTE [DISTWIDTH] IN BLOOD BY AUTOMATED COUNT: 21.3 % (ref 11.5–14.5)
EST. GFR  (NO RACE VARIABLE): >60 ML/MIN/1.73 M^2
GLUCOSE SERPL-MCNC: 258 MG/DL (ref 70–110)
HCO3 UR-SCNC: 29.2 MMOL/L (ref 24–28)
HCT VFR BLD AUTO: 33.9 % (ref 37–48.5)
HGB BLD-MCNC: 11 G/DL (ref 12–16)
IMM GRANULOCYTES # BLD AUTO: 0.01 K/UL (ref 0–0.04)
IMM GRANULOCYTES NFR BLD AUTO: 0.2 % (ref 0–0.5)
LDH SERPL L TO P-CCNC: 1.68 MMOL/L (ref 0.5–2.2)
LYMPHOCYTES # BLD AUTO: 0.9 K/UL (ref 1–4.8)
LYMPHOCYTES NFR BLD: 20.3 % (ref 18–48)
MAGNESIUM SERPL-MCNC: 1.7 MG/DL (ref 1.6–2.6)
MCH RBC QN AUTO: 28.3 PG (ref 27–31)
MCHC RBC AUTO-ENTMCNC: 32.4 G/DL (ref 32–36)
MCV RBC AUTO: 87 FL (ref 82–98)
MONOCYTES # BLD AUTO: 0.4 K/UL (ref 0.3–1)
MONOCYTES NFR BLD: 7.9 % (ref 4–15)
NEUTROPHILS # BLD AUTO: 3.1 K/UL (ref 1.8–7.7)
NEUTROPHILS NFR BLD: 70 % (ref 38–73)
NRBC BLD-RTO: 0 /100 WBC
PCO2 BLDA: 40.1 MMHG (ref 35–45)
PH SMN: 7.47 [PH] (ref 7.35–7.45)
PLATELET # BLD AUTO: 438 K/UL (ref 150–450)
PMV BLD AUTO: 9.7 FL (ref 9.2–12.9)
PO2 BLDA: 25 MMHG (ref 40–60)
POC BE: 6 MMOL/L
POC SATURATED O2: 51 % (ref 95–100)
POC TCO2: 30 MMOL/L (ref 24–29)
POTASSIUM SERPL-SCNC: 4.7 MMOL/L (ref 3.5–5.1)
PROCALCITONIN SERPL IA-MCNC: 0.05 NG/ML
PROT SERPL-MCNC: 8.1 G/DL (ref 6–8.4)
PROVIDER CREDENTIALS: ABNORMAL
PROVIDER NOTIFIED: ABNORMAL
RBC # BLD AUTO: 3.89 M/UL (ref 4–5.4)
SAMPLE: ABNORMAL
SAMPLE: NORMAL
SITE: ABNORMAL
SITE: NORMAL
SODIUM SERPL-SCNC: 132 MMOL/L (ref 136–145)
TROPONIN I SERPL DL<=0.01 NG/ML-MCNC: 0.01 NG/ML (ref 0–0.03)
VERBAL RESULT READBACK PERFORMED: YES
WBC # BLD AUTO: 4.44 K/UL (ref 3.9–12.7)

## 2024-03-02 PROCEDURE — 81001 URINALYSIS AUTO W/SCOPE: CPT

## 2024-03-02 PROCEDURE — 93010 ELECTROCARDIOGRAM REPORT: CPT | Mod: ,,, | Performed by: INTERNAL MEDICINE

## 2024-03-02 PROCEDURE — 80053 COMPREHEN METABOLIC PANEL: CPT

## 2024-03-02 PROCEDURE — 99900035 HC TECH TIME PER 15 MIN (STAT)

## 2024-03-02 PROCEDURE — 83605 ASSAY OF LACTIC ACID: CPT

## 2024-03-02 PROCEDURE — 84145 PROCALCITONIN (PCT): CPT

## 2024-03-02 PROCEDURE — 12000002 HC ACUTE/MED SURGE SEMI-PRIVATE ROOM

## 2024-03-02 PROCEDURE — 96361 HYDRATE IV INFUSION ADD-ON: CPT

## 2024-03-02 PROCEDURE — 83735 ASSAY OF MAGNESIUM: CPT

## 2024-03-02 PROCEDURE — 85025 COMPLETE CBC W/AUTO DIFF WBC: CPT

## 2024-03-02 PROCEDURE — 82962 GLUCOSE BLOOD TEST: CPT

## 2024-03-02 PROCEDURE — 96365 THER/PROPH/DIAG IV INF INIT: CPT

## 2024-03-02 PROCEDURE — 84484 ASSAY OF TROPONIN QUANT: CPT

## 2024-03-02 PROCEDURE — 63600175 PHARM REV CODE 636 W HCPCS

## 2024-03-02 PROCEDURE — 86803 HEPATITIS C AB TEST: CPT | Performed by: PHYSICIAN ASSISTANT

## 2024-03-02 PROCEDURE — 25000003 PHARM REV CODE 250

## 2024-03-02 PROCEDURE — 96375 TX/PRO/DX INJ NEW DRUG ADDON: CPT

## 2024-03-02 PROCEDURE — 87040 BLOOD CULTURE FOR BACTERIA: CPT | Mod: 59

## 2024-03-02 PROCEDURE — 99285 EMERGENCY DEPT VISIT HI MDM: CPT | Mod: 25

## 2024-03-02 PROCEDURE — 82803 BLOOD GASES ANY COMBINATION: CPT

## 2024-03-02 PROCEDURE — 93005 ELECTROCARDIOGRAM TRACING: CPT

## 2024-03-02 PROCEDURE — 83880 ASSAY OF NATRIURETIC PEPTIDE: CPT

## 2024-03-02 PROCEDURE — 87389 HIV-1 AG W/HIV-1&-2 AB AG IA: CPT | Performed by: PHYSICIAN ASSISTANT

## 2024-03-02 RX ORDER — ONDANSETRON HYDROCHLORIDE 2 MG/ML
4 INJECTION, SOLUTION INTRAVENOUS
Status: COMPLETED | OUTPATIENT
Start: 2024-03-02 | End: 2024-03-02

## 2024-03-02 RX ORDER — MORPHINE SULFATE 2 MG/ML
2 INJECTION, SOLUTION INTRAMUSCULAR; INTRAVENOUS
Status: COMPLETED | OUTPATIENT
Start: 2024-03-02 | End: 2024-03-02

## 2024-03-02 RX ADMIN — SODIUM CHLORIDE, POTASSIUM CHLORIDE, SODIUM LACTATE AND CALCIUM CHLORIDE 500 ML: 600; 310; 30; 20 INJECTION, SOLUTION INTRAVENOUS at 08:03

## 2024-03-02 RX ADMIN — CEFTRIAXONE 2 G: 2 INJECTION, POWDER, FOR SOLUTION INTRAMUSCULAR; INTRAVENOUS at 10:03

## 2024-03-02 RX ADMIN — MORPHINE SULFATE 2 MG: 2 INJECTION, SOLUTION INTRAMUSCULAR; INTRAVENOUS at 08:03

## 2024-03-02 RX ADMIN — ONDANSETRON 4 MG: 2 INJECTION INTRAMUSCULAR; INTRAVENOUS at 08:03

## 2024-03-02 NOTE — Clinical Note
Diagnosis: Pneumonia of right lung due to infectious organism, unspecified part of lung [[id number]]   Future Attending Provider: IMMANUEL GROVE [[id number]]

## 2024-03-03 PROBLEM — E46 PROTEIN-CALORIE MALNUTRITION: Status: ACTIVE | Noted: 2024-03-03

## 2024-03-03 PROBLEM — E11.9 TYPE 2 DIABETES MELLITUS, WITHOUT LONG-TERM CURRENT USE OF INSULIN: Status: ACTIVE | Noted: 2024-03-03

## 2024-03-03 PROBLEM — R10.9 ABDOMINAL PAIN: Status: ACTIVE | Noted: 2024-03-03

## 2024-03-03 PROBLEM — R07.89 OTHER CHEST PAIN: Status: ACTIVE | Noted: 2024-03-03

## 2024-03-03 PROBLEM — M79.606 LOWER EXTREMITY PAIN: Status: ACTIVE | Noted: 2024-03-03

## 2024-03-03 PROBLEM — J18.9 PNEUMONIA INVOLVING RIGHT LUNG: Status: ACTIVE | Noted: 2024-03-03

## 2024-03-03 PROBLEM — I73.9 PERIPHERAL ARTERIAL DISEASE: Status: ACTIVE | Noted: 2024-03-03

## 2024-03-03 PROBLEM — I10 HYPERTENSION: Status: ACTIVE | Noted: 2024-03-03

## 2024-03-03 LAB
BACTERIA #/AREA URNS AUTO: NORMAL /HPF
BILIRUB UR QL STRIP: NEGATIVE
BNP SERPL-MCNC: 28 PG/ML (ref 0–99)
CLARITY UR REFRACT.AUTO: CLEAR
COLOR UR AUTO: COLORLESS
GLUCOSE SERPL-MCNC: 114 MG/DL (ref 70–110)
GLUCOSE SERPL-MCNC: 243 MG/DL (ref 70–110)
GLUCOSE UR QL STRIP: ABNORMAL
HCV AB SERPL QL IA: NORMAL
HGB UR QL STRIP: NEGATIVE
HIV 1+2 AB+HIV1 P24 AG SERPL QL IA: NORMAL
KETONES UR QL STRIP: NEGATIVE
LEUKOCYTE ESTERASE UR QL STRIP: NEGATIVE
MICROSCOPIC COMMENT: NORMAL
NITRITE UR QL STRIP: NEGATIVE
OHS QRS DURATION: 74 MS
OHS QRS DURATION: 84 MS
OHS QTC CALCULATION: 442 MS
OHS QTC CALCULATION: 447 MS
PH UR STRIP: 8 [PH] (ref 5–8)
POCT GLUCOSE: 277 MG/DL (ref 70–110)
PROT UR QL STRIP: NEGATIVE
SP GR UR STRIP: 1.02 (ref 1–1.03)
URN SPEC COLLECT METH UR: ABNORMAL
YEAST UR QL AUTO: NORMAL

## 2024-03-03 PROCEDURE — S0179 MEGESTROL 20 MG: HCPCS

## 2024-03-03 PROCEDURE — 25000003 PHARM REV CODE 250

## 2024-03-03 PROCEDURE — 21400001 HC TELEMETRY ROOM

## 2024-03-03 PROCEDURE — 63600175 PHARM REV CODE 636 W HCPCS

## 2024-03-03 PROCEDURE — 94761 N-INVAS EAR/PLS OXIMETRY MLT: CPT

## 2024-03-03 PROCEDURE — 96366 THER/PROPH/DIAG IV INF ADDON: CPT

## 2024-03-03 PROCEDURE — 63700000 PHARM REV CODE 250 ALT 637 W/O HCPCS

## 2024-03-03 PROCEDURE — 11000001 HC ACUTE MED/SURG PRIVATE ROOM

## 2024-03-03 PROCEDURE — 25500020 PHARM REV CODE 255: Performed by: STUDENT IN AN ORGANIZED HEALTH CARE EDUCATION/TRAINING PROGRAM

## 2024-03-03 RX ORDER — METFORMIN HYDROCHLORIDE 500 MG/1
500 TABLET ORAL 2 TIMES DAILY
COMMUNITY
Start: 2024-02-02 | End: 2024-05-20

## 2024-03-03 RX ORDER — PANTOPRAZOLE SODIUM 40 MG/1
40 TABLET, DELAYED RELEASE ORAL DAILY
COMMUNITY
Start: 2024-02-02

## 2024-03-03 RX ORDER — GLUCAGON 1 MG
1 KIT INJECTION
Status: DISCONTINUED | OUTPATIENT
Start: 2024-03-03 | End: 2024-03-04 | Stop reason: HOSPADM

## 2024-03-03 RX ORDER — PANTOPRAZOLE SODIUM 40 MG/1
40 TABLET, DELAYED RELEASE ORAL DAILY
Status: DISCONTINUED | OUTPATIENT
Start: 2024-03-03 | End: 2024-03-04 | Stop reason: HOSPADM

## 2024-03-03 RX ORDER — ACETAMINOPHEN 325 MG/1
650 TABLET ORAL EVERY 4 HOURS PRN
Status: DISCONTINUED | OUTPATIENT
Start: 2024-03-03 | End: 2024-03-04 | Stop reason: HOSPADM

## 2024-03-03 RX ORDER — IPRATROPIUM BROMIDE AND ALBUTEROL SULFATE 2.5; .5 MG/3ML; MG/3ML
3 SOLUTION RESPIRATORY (INHALATION) EVERY 4 HOURS PRN
Status: DISCONTINUED | OUTPATIENT
Start: 2024-03-03 | End: 2024-03-04 | Stop reason: HOSPADM

## 2024-03-03 RX ORDER — ACETAMINOPHEN 500 MG
1000 TABLET ORAL EVERY 8 HOURS
Status: DISCONTINUED | OUTPATIENT
Start: 2024-03-03 | End: 2024-03-04 | Stop reason: HOSPADM

## 2024-03-03 RX ORDER — METHOCARBAMOL 500 MG/1
500 TABLET, FILM COATED ORAL 3 TIMES DAILY
Status: DISCONTINUED | OUTPATIENT
Start: 2024-03-03 | End: 2024-03-04 | Stop reason: HOSPADM

## 2024-03-03 RX ORDER — IBUPROFEN 200 MG
16 TABLET ORAL
Status: DISCONTINUED | OUTPATIENT
Start: 2024-03-03 | End: 2024-03-04 | Stop reason: HOSPADM

## 2024-03-03 RX ORDER — AZITHROMYCIN 250 MG/1
250 TABLET, FILM COATED ORAL DAILY
Status: DISCONTINUED | OUTPATIENT
Start: 2024-03-03 | End: 2024-03-04 | Stop reason: HOSPADM

## 2024-03-03 RX ORDER — NALOXONE HCL 0.4 MG/ML
0.02 VIAL (ML) INJECTION
Status: DISCONTINUED | OUTPATIENT
Start: 2024-03-03 | End: 2024-03-04 | Stop reason: HOSPADM

## 2024-03-03 RX ORDER — LOSARTAN POTASSIUM 50 MG/1
50 TABLET ORAL DAILY
Status: ON HOLD | COMMUNITY
Start: 2024-02-02 | End: 2024-03-04 | Stop reason: HOSPADM

## 2024-03-03 RX ORDER — ATORVASTATIN CALCIUM 10 MG/1
10 TABLET, FILM COATED ORAL DAILY
Status: DISCONTINUED | OUTPATIENT
Start: 2024-03-03 | End: 2024-03-04 | Stop reason: HOSPADM

## 2024-03-03 RX ORDER — IBUPROFEN 200 MG
24 TABLET ORAL
Status: DISCONTINUED | OUTPATIENT
Start: 2024-03-03 | End: 2024-03-04 | Stop reason: HOSPADM

## 2024-03-03 RX ORDER — CARBAMAZEPINE 200 MG/1
200 TABLET ORAL DAILY
COMMUNITY
Start: 2024-02-02 | End: 2024-05-20

## 2024-03-03 RX ORDER — ACETAMINOPHEN 500 MG
1000 TABLET ORAL EVERY 8 HOURS PRN
Status: DISCONTINUED | OUTPATIENT
Start: 2024-03-03 | End: 2024-03-03

## 2024-03-03 RX ORDER — SIMETHICONE 80 MG
1 TABLET,CHEWABLE ORAL 4 TIMES DAILY PRN
Status: DISCONTINUED | OUTPATIENT
Start: 2024-03-03 | End: 2024-03-04 | Stop reason: HOSPADM

## 2024-03-03 RX ORDER — ATORVASTATIN CALCIUM 10 MG/1
10 TABLET, FILM COATED ORAL DAILY
Status: ON HOLD | COMMUNITY
Start: 2024-02-02 | End: 2024-03-04 | Stop reason: HOSPADM

## 2024-03-03 RX ORDER — LOSARTAN POTASSIUM 50 MG/1
50 TABLET ORAL DAILY
Status: DISCONTINUED | OUTPATIENT
Start: 2024-03-03 | End: 2024-03-04

## 2024-03-03 RX ORDER — CARBAMAZEPINE 200 MG/1
200 TABLET ORAL DAILY
Status: DISCONTINUED | OUTPATIENT
Start: 2024-03-03 | End: 2024-03-04 | Stop reason: HOSPADM

## 2024-03-03 RX ORDER — ONDANSETRON 8 MG/1
8 TABLET, ORALLY DISINTEGRATING ORAL EVERY 8 HOURS PRN
Status: DISCONTINUED | OUTPATIENT
Start: 2024-03-03 | End: 2024-03-04 | Stop reason: HOSPADM

## 2024-03-03 RX ORDER — MEGESTROL ACETATE 40 MG/ML
400 SUSPENSION ORAL DAILY
Status: DISCONTINUED | OUTPATIENT
Start: 2024-03-03 | End: 2024-03-04 | Stop reason: HOSPADM

## 2024-03-03 RX ORDER — MEGESTROL ACETATE 40 MG/ML
400 SUSPENSION ORAL DAILY
COMMUNITY
Start: 2024-02-29

## 2024-03-03 RX ORDER — BISACODYL 10 MG/1
10 SUPPOSITORY RECTAL DAILY PRN
Status: DISCONTINUED | OUTPATIENT
Start: 2024-03-03 | End: 2024-03-04 | Stop reason: HOSPADM

## 2024-03-03 RX ORDER — TALC
6 POWDER (GRAM) TOPICAL NIGHTLY PRN
Status: DISCONTINUED | OUTPATIENT
Start: 2024-03-03 | End: 2024-03-04 | Stop reason: HOSPADM

## 2024-03-03 RX ORDER — INSULIN ASPART 100 [IU]/ML
0-5 INJECTION, SOLUTION INTRAVENOUS; SUBCUTANEOUS
Status: DISCONTINUED | OUTPATIENT
Start: 2024-03-03 | End: 2024-03-04 | Stop reason: HOSPADM

## 2024-03-03 RX ORDER — HEPARIN SODIUM 5000 [USP'U]/ML
5000 INJECTION, SOLUTION INTRAVENOUS; SUBCUTANEOUS EVERY 8 HOURS
Status: DISCONTINUED | OUTPATIENT
Start: 2024-03-04 | End: 2024-03-04 | Stop reason: HOSPADM

## 2024-03-03 RX ORDER — HYDRALAZINE HYDROCHLORIDE 20 MG/ML
5 INJECTION INTRAMUSCULAR; INTRAVENOUS EVERY 6 HOURS PRN
Status: DISCONTINUED | OUTPATIENT
Start: 2024-03-03 | End: 2024-03-04 | Stop reason: HOSPADM

## 2024-03-03 RX ORDER — ALUMINUM HYDROXIDE, MAGNESIUM HYDROXIDE, AND SIMETHICONE 1200; 120; 1200 MG/30ML; MG/30ML; MG/30ML
30 SUSPENSION ORAL 4 TIMES DAILY PRN
Status: DISCONTINUED | OUTPATIENT
Start: 2024-03-03 | End: 2024-03-04 | Stop reason: HOSPADM

## 2024-03-03 RX ORDER — SODIUM CHLORIDE 0.9 % (FLUSH) 0.9 %
5 SYRINGE (ML) INJECTION
Status: DISCONTINUED | OUTPATIENT
Start: 2024-03-03 | End: 2024-03-04 | Stop reason: HOSPADM

## 2024-03-03 RX ORDER — LOSARTAN POTASSIUM 50 MG/1
50 TABLET ORAL DAILY
Status: DISCONTINUED | OUTPATIENT
Start: 2024-03-03 | End: 2024-03-03

## 2024-03-03 RX ORDER — POLYETHYLENE GLYCOL 3350 17 G/17G
17 POWDER, FOR SOLUTION ORAL 2 TIMES DAILY PRN
Status: DISCONTINUED | OUTPATIENT
Start: 2024-03-03 | End: 2024-03-04 | Stop reason: HOSPADM

## 2024-03-03 RX ADMIN — METHOCARBAMOL 500 MG: 500 TABLET ORAL at 07:03

## 2024-03-03 RX ADMIN — METHOCARBAMOL 500 MG: 500 TABLET ORAL at 04:03

## 2024-03-03 RX ADMIN — INSULIN ASPART 1 UNITS: 100 INJECTION, SOLUTION INTRAVENOUS; SUBCUTANEOUS at 10:03

## 2024-03-03 RX ADMIN — CARBAMAZEPINE 200 MG: 200 TABLET ORAL at 09:03

## 2024-03-03 RX ADMIN — IOHEXOL 60 ML: 350 INJECTION, SOLUTION INTRAVENOUS at 12:03

## 2024-03-03 RX ADMIN — ACETAMINOPHEN 500 MG: 500 TABLET ORAL at 04:03

## 2024-03-03 RX ADMIN — PANTOPRAZOLE SODIUM 40 MG: 40 TABLET, DELAYED RELEASE ORAL at 09:03

## 2024-03-03 RX ADMIN — ACETAMINOPHEN 1000 MG: 500 TABLET ORAL at 10:03

## 2024-03-03 RX ADMIN — ACETAMINOPHEN 1000 MG: 500 TABLET ORAL at 07:03

## 2024-03-03 RX ADMIN — MEGESTROL ACETATE 400 MG: 400 SUSPENSION ORAL at 01:03

## 2024-03-03 RX ADMIN — LOSARTAN POTASSIUM 50 MG: 50 TABLET, FILM COATED ORAL at 07:03

## 2024-03-03 RX ADMIN — Medication 6 MG: at 10:03

## 2024-03-03 RX ADMIN — CEFTRIAXONE 1 G: 1 INJECTION, POWDER, FOR SOLUTION INTRAMUSCULAR; INTRAVENOUS at 09:03

## 2024-03-03 RX ADMIN — ATORVASTATIN CALCIUM 10 MG: 10 TABLET, FILM COATED ORAL at 09:03

## 2024-03-03 RX ADMIN — METHOCARBAMOL 500 MG: 500 TABLET ORAL at 09:03

## 2024-03-03 RX ADMIN — AZITHROMYCIN DIHYDRATE 250 MG: 250 TABLET ORAL at 09:03

## 2024-03-03 NOTE — ASSESSMENT & PLAN NOTE
"Jing Velasquez is a 83 y.o. 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     - No rest pain, no evidence of lower extremity wounds.   - No further inpatient workup required from Vascular perspective, Outpatient follow up with ADENIKE/PVRs and mesenteric duplex with Dr. Greenfield, clinic message sent.   "

## 2024-03-03 NOTE — ASSESSMENT & PLAN NOTE
Some concern for PNA on CXR. CT shows Tree-in-bud nodules in the left lower lobe and possibly in the lingula.  Findings suggestive of small airway disease, noting that evaluation is limited due to significant respiratory motion. Right upper lobe dense bandlike and nodular consolidative opacities with atelectasis and bronchiectasis.  Findings may reflect chronic changes with superimposed infectious/inflammatory process.Dense right posterior pleural calcifications.  Differential to include sequela of asbestosis, postprocedural such as pleurodesis, or secondary to recurrent inflammatory/infectious process.    -No leukocytosis, normal procal, and no fevers or hypoxia  -Patient/daughter reports she felt more energetic after a few days of antibiotics for PNA a few days ago and now back to feeling fatigued  -Continue empiric abx and supportive measures for now though currently low suspicions for bacterial PNA

## 2024-03-03 NOTE — CARE UPDATE
Patient seen and examined at bedside. Used medical interpretter #[id number] for Vietnamese interpretation. Patient saturating well on RA. Reports cough with occasional sputum production. CTX and azithro ordered for CAP coverage. Reports her legs feel very tired after walking. Awaiting vascular surgery eval for PAD

## 2024-03-03 NOTE — ASSESSMENT & PLAN NOTE
Abdominal pain   Lower extremity pain   -Possibly experiencing abdominal pain and leg pain from PVD  -CTA C/A/P Advanced aortoiliac atherosclerosis. Severe stenosis versus intermittent occlusion of the superior mesenteric artery. Significant stenosis of the iliofemoral arteries with probable occlusion of the right superficial femoral artery and severe stenosis of the left superficial femoral artery. Evaluation of small and medium arteries significantly limited by motion.   -Vascular surgery consulted, appreciate recommendations

## 2024-03-03 NOTE — ASSESSMENT & PLAN NOTE
"Patient's FSGs are controlled on current medication regimen.  Last A1c reviewed- No results found for: "LABA1C", "HGBA1C"  Most recent fingerstick glucose reviewed- No results for input(s): "POCTGLUCOSE" in the last 24 hours.  Current correctional scale  Low  Maintain anti-hyperglycemic dose as follows-   Antihyperglycemics (From admission, onward)      Start     Stop Route Frequency Ordered    03/03/24 0516  insulin aspart U-100 pen 0-5 Units         -- SubQ Before meals & nightly PRN 03/03/24 0417          Oral hypoglycemics while patient is in the hospital.   On metformin outpatient     "

## 2024-03-03 NOTE — SUBJECTIVE & OBJECTIVE
(Not in a hospital admission)      Review of patient's allergies indicates:  No Known Allergies    Past Medical History:   Diagnosis Date    Diabetes mellitus     Hypertension      History reviewed. No pertinent surgical history.  Family History    None       Tobacco Use    Smoking status: Never    Smokeless tobacco: Never   Substance and Sexual Activity    Alcohol use: No    Drug use: Not on file    Sexual activity: Not on file     Review of Systems   Constitutional:  Positive for appetite change and fatigue.   HENT:  Negative for trouble swallowing (occasionally).    Respiratory:  Negative for cough and shortness of breath.    Gastrointestinal:  Positive for abdominal pain. Negative for diarrhea, nausea and vomiting.   Genitourinary:  Negative for dysuria and frequency.   Musculoskeletal:  Positive for arthralgias and gait problem.   Skin:  Negative for rash and wound.   Neurological:  Negative for weakness, light-headedness, numbness and headaches.   Psychiatric/Behavioral:  Negative for agitation and confusion.      Objective:     Vital Signs (Most Recent):  Temp: 98.6 °F (37 °C) (03/03/24 1635)  Pulse: 88 (03/03/24 1702)  Resp: 20 (03/03/24 1702)  BP: (!) 170/71 (03/03/24 1702)  SpO2: 97 % (03/03/24 1702) Vital Signs (24h Range):  Temp:  [97.4 °F (36.3 °C)-98.6 °F (37 °C)] 98.6 °F (37 °C)  Pulse:  [] 88  Resp:  [16-25] 20  SpO2:  [94 %-100 %] 97 %  BP: (119-202)/(58-91) 170/71        There is no height or weight on file to calculate BMI.      Physical Exam  Constitutional:       Comments: Small stature   HENT:      Head: Normocephalic.   Eyes:      Pupils: Pupils are equal, round, and reactive to light.   Cardiovascular:      Rate and Rhythm: Normal rate.      Pulses:           Femoral pulses are 1+ on the right side and 1+ on the left side.     Comments: Right monophasic DP signal present  Left biphasic DP, monophasic PT signal present.  Musculoskeletal:         General: No swelling, tenderness or signs  of injury.      Right lower leg: No edema.      Left lower leg: No edema.   Neurological:      General: No focal deficit present.      Mental Status: She is alert.          Significant Labs:  All pertinent labs from the last 24 hours have been reviewed.    Significant Diagnostics:  I have reviewed all pertinent imaging results/findings within the past 24 hours.

## 2024-03-03 NOTE — ASSESSMENT & PLAN NOTE
Nutrition consulted. Most recent weight and BMI monitored-     Measurements:  Wt Readings from Last 1 Encounters:   08/11/22 33.1 kg (73 lb)   There is no height or weight on file to calculate BMI.     -Continue Megastrol   -Poor PO intake x 2 years per daughter, tries ot give her ensure etc  -RD consult

## 2024-03-03 NOTE — ASSESSMENT & PLAN NOTE
Appears to be MSK. Tender to palpaition/reprodiuble on exam  Check XR left shoulder  Tylenol and robaxin for pain  Repeat cardiac work up if indicated   BP control

## 2024-03-03 NOTE — SUBJECTIVE & OBJECTIVE
Past Medical History:   Diagnosis Date    Diabetes mellitus     Hypertension        History reviewed. No pertinent surgical history.    Review of patient's allergies indicates:  No Known Allergies    No current facility-administered medications on file prior to encounter.     Current Outpatient Medications on File Prior to Encounter   Medication Sig    atorvastatin (LIPITOR) 10 MG tablet Take 10 mg by mouth once daily.    carBAMazepine (TEGRETOL) 200 mg tablet Take 200 mg by mouth once daily.    losartan (COZAAR) 50 MG tablet Take 50 mg by mouth once daily.    megestroL (MEGACE) 400 mg/10 mL (40 mg/mL) Susp Take 400 mg by mouth.    metFORMIN (GLUCOPHAGE) 500 MG tablet Take 500 mg by mouth 2 (two) times daily.    pantoprazole (PROTONIX) 40 MG tablet Take 40 mg by mouth once daily.    diclofenac sodium (VOLTAREN) 1 % Gel Apply 2 g topically 4 (four) times daily.    diltiaZEM (CARDIZEM) 90 MG tablet Take 90 mg by mouth 4 (four) times daily.    glipiZIDE (GLUCOTROL) 5 MG TR24 Take 5 mg by mouth daily with breakfast.    pregabalin (LYRICA) 75 MG capsule Take 1 capsule (75 mg total) by mouth 2 (two) times daily.    SITagliptan-metformin (JANUMET) 50-1,000 mg per tablet Take 1 tablet by mouth 2 (two) times daily with meals.     Family History    None       Tobacco Use    Smoking status: Never    Smokeless tobacco: Never   Substance and Sexual Activity    Alcohol use: No    Drug use: Not on file    Sexual activity: Not on file     Review of Systems   Constitutional:  Positive for appetite change and fatigue.   HENT:  Positive for trouble swallowing (occasionally).    Eyes:  Negative for photophobia and visual disturbance.   Respiratory:  Negative for cough and shortness of breath.    Cardiovascular:  Positive for chest pain. Negative for leg swelling.   Gastrointestinal:  Positive for abdominal pain. Negative for diarrhea, nausea and vomiting.   Genitourinary:  Negative for dysuria and frequency.   Musculoskeletal:   Positive for arthralgias and gait problem.   Skin:  Negative for rash and wound.   Neurological:  Negative for light-headedness and headaches.   Psychiatric/Behavioral:  Negative for agitation and confusion.      Objective:     Vital Signs (Most Recent):  Temp: 97.8 °F (36.6 °C) (03/03/24 0500)  Pulse: 99 (03/03/24 0500)  Resp: 19 (03/03/24 0500)  BP: (!) 185/80 (03/03/24 0500)  SpO2: 99 % (03/03/24 0500) Vital Signs (24h Range):  Temp:  [97.4 °F (36.3 °C)-98.2 °F (36.8 °C)] 97.8 °F (36.6 °C)  Pulse:  [] 99  Resp:  [16-25] 19  SpO2:  [94 %-99 %] 99 %  BP: (119-185)/(58-80) 185/80        There is no height or weight on file to calculate BMI.     Physical Exam  Vitals and nursing note reviewed.   Constitutional:       Appearance: She is cachectic.   HENT:      Head: Normocephalic and atraumatic.      Nose: Nose normal.      Mouth/Throat:      Pharynx: No oropharyngeal exudate.   Eyes:      Extraocular Movements: Extraocular movements intact.      Conjunctiva/sclera: Conjunctivae normal.   Cardiovascular:      Rate and Rhythm: Normal rate and regular rhythm.      Heart sounds: Normal heart sounds.   Pulmonary:      Effort: Pulmonary effort is normal. No respiratory distress.      Breath sounds: Examination of the right-lower field reveals decreased breath sounds. Decreased breath sounds present.      Comments: TTP across left chest and near anterior aspect left shoulder   Chest:      Chest wall: Tenderness present.   Abdominal:      General: Bowel sounds are normal. There is no distension.      Palpations: Abdomen is soft.      Tenderness: There is abdominal tenderness.   Musculoskeletal:      Cervical back: Normal range of motion and neck supple.      Right lower leg: No edema.      Left lower leg: No edema.   Skin:     General: Skin is warm and dry.   Neurological:      Mental Status: She is alert and oriented to person, place, and time.      Motor: Weakness (generlaized weakness) present.   Psychiatric:          Mood and Affect: Mood normal.         Thought Content: Thought content normal.                Significant Labs: All pertinent labs within the past 24 hours have been reviewed.  Blood Culture:   Recent Labs   Lab 03/02/24 1950 03/02/24 1955   LABBLOO No Growth to date No Growth to date     BMP:   Recent Labs   Lab 03/02/24 1950   *   *   K 4.7   CL 95   CO2 26   BUN 32*   CREATININE 0.8   CALCIUM 9.4   MG 1.7     CBC:   Recent Labs   Lab 03/02/24 1950   WBC 4.44   HGB 11.0*   HCT 33.9*        CMP:   Recent Labs   Lab 03/02/24 1950   *   K 4.7   CL 95   CO2 26   *   BUN 32*   CREATININE 0.8   CALCIUM 9.4   PROT 8.1   ALBUMIN 3.1*   BILITOT 0.1   ALKPHOS 115   AST 17   ALT 20   ANIONGAP 11     Cardiac Markers:   Recent Labs   Lab 03/02/24 1955   BNP 28     Magnesium:   Recent Labs   Lab 03/02/24 1950   MG 1.7       Troponin:   Recent Labs   Lab 03/02/24 1950   TROPONINI 0.009       Urine Studies:   Recent Labs   Lab 03/02/24  2354   COLORU Colorless*   APPEARANCEUA Clear   PHUR 8.0   SPECGRAV 1.020   PROTEINUA Negative   GLUCUA 4+*   KETONESU Negative   BILIRUBINUA Negative   OCCULTUA Negative   NITRITE Negative   LEUKOCYTESUR Negative   BACTERIA None       Significant Imaging: I have reviewed all pertinent imaging results/findings within the past 24 hours.  CTA Chest Abdomen Pelvis  Narrative: EXAMINATION:  CTA CHEST ABDOMEN PELVIS    CLINICAL HISTORY:  Aortic atherosclerosis;Concern for dissection, pt has chest and abdominal pain;    TECHNIQUE:  Initial noncontrast  images of the chest, abdomen and pelvis were obtained. Using 60 cc of Omnipaque 350 IV contrast material, and multi-detector helical CT technique, axial CT angiogram images of the chest, abdomen and pelvis were obtained.  MIP images were obtained.    COMPARISON:  Abdomen radiograph 03/02/2024, 08/11/2022    Lumbar spine radiograph 08/11/2022    Chest radiograph 07/19/2017    FINDINGS:  Evaluation is limited  by extensive streak artifact due to the patient's arms overlying the field of view.  Exam quality is limited by severe motion artifact.  Evaluation also limited by paucity of intra-abdominal fat.    Thoracic soft tissues: 1.7 cm right thyroid gland nodule.    Heart: Coronary artery calcific atherosclerosis in a multi vessel distribution.  Trace pericardial fluid.    Nelia/Mediastinum: No significant lymphadenopathy.  No acute pulmonary embolus identified.    Lungs: Evaluation of the lung parenchyma limited due to respiratory motion.  Biapical fibronodular scarring.  Tree-in-bud nodules in the left lower lobe and possibly in the lingula several pleural based nodules in left lower lobe and lingula (series 2, image 212, 284).    Right upper lobe dense bandlike and nodular consolidative opacities.  Right upper lobe atelectasis and bronchiectasis.    Dense right posterior pleural dense calcifications.  Right-sided pleural thickening.    Liver: No focal lesions.    Gallbladder: Surgically absent.    Bile Ducts: Mild intrahepatic biliary ductal dilatation.  Common bile duct is dilated, expected post cholecystectomy.  Biliary distension is likely incidental in this patient with normal serum bilirubin level.    Pancreas: No pancreatic mass lesion or peripancreatic inflammatory change.    Spleen: Unremarkable.    Adrenals: Unremarkable.    Kidneys/ Ureters: Normal in size and enhance symmetrically.  No nephrolithiasis.  No hydroureteronephrosis.    Bladder: Moderately distended bladder.    Reproductive organs: Unremarkable.    GI Tract/Mesentery: Stomach is normal appearance.  Visualized loops of small and large bowel are normal in caliber without evidence for obstruction or inflammation.   Large volume stool burden.    No abdominopelvic ascites or intraperitoneal free air.    Abdominal wall: Unremarkable.    Bones: Diffuse osteopenia.  No acute displaced fracture.  T12 vertebral body compression deformity, which has  progressed compared to lumbar spine radiograph 2022.  Sclerotic appearance of the T12 vertebral body suggest chronic fracture deformity or less likely underlying sclerotic lesion.    Postoperative changes of left hip internal fixation.    Vasculature: Left-sided two-vessel aortic arch without significant calcific atherosclerosis.  Thoracic and abdominal aorta maintains appropriate caliber, contour, and course.  Extensive calcified and noncalcified aortoiliac atherosclerosis.  No dissection.  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  Severe stenosis versus intermittent occlusion of the superior mesenteric artery beyond the stent.  Significant iliofemoral atherosclerosis with occlusion of the right superficial femoral artery.  Significant stenosis of the left superficial femoral artery.    Portal veins, SMV and splenic vein are patent.  No portal venous gas.  Impression: 1. No aortic dissection as clinically questioned.  Advanced aortoiliac atherosclerosis.  Severe stenosis versus intermittent occlusion of the superior mesenteric artery.  Significant stenosis of the iliofemoral arteries with probable occlusion of the right superficial femoral artery and severe stenosis of the left superficial femoral artery.  Evaluation of small and medium arteries significantly limited by motion.  2. Overall limited quality study.  3. Tree-in-bud nodules in the left lower lobe and possibly in the lingula.  Findings suggestive of small airway disease, noting that evaluation is limited due to significant respiratory motion.  4. Right upper lobe dense bandlike and nodular consolidative opacities with atelectasis and bronchiectasis.  Findings may reflect chronic changes with superimposed infectious/inflammatory process.  5. Dense right posterior pleural calcifications.  Differential to include sequela of asbestosis, postprocedural such as pleurodesis, or secondary to recurrent inflammatory/infectious process.  6. Significantly distended urinary bladder.   Consider Sarkar catheter placement if the patient is unable to void spontaneously.  7. 1.7 cm right thyroid gland nodule.  Consider nonemergent outpatient ultrasound.  8. Additional findings discussed in the body of the report.  This report was flagged in [employer] as abnormal.    Electronically signed by resident: Carmen Christie  Date:    03/03/2024  Time:    00:39    Electronically signed by: Miah Velázquez MD  Date:    03/03/2024  Time:    02:11

## 2024-03-03 NOTE — HPI
Jing Velasquez is a 83 y.o. [place]ian speaking female with history of diabetes, hypertension, ?trigeminal neuralgia on carbamazepine, poor appetite on megestrol who presents with her daughter for multiple complaints. She has left sided chest pain that radiates to her left shoulder. She thinks this has to do with how she sleeps on it. It is tender to the touch and reproducible on exam. Lying down makes it better and movement like sitting up makes it worse. She also has abdominal pain worse with position change as well. Her daughter reports poor appetite x 2 years and issues with her stomach. She denies nausea, vomiting, fever, chills, cough, SOB. She is very fatigued. She was recently diagnosed with PNA and took antibiotic and felt better but now feels exhausted again. She also has poorly localized pain either near her right groin/hip/bladder, unclear. It worsens with walking. Her daughter state does not walk much these days,.    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     In the ED, hypertensive. Otherwise AVFSS. EKG showed nonspecific  ST abnormality, though repeat shows sinus tachycardia.   Lactic wnl. No leukocytosis. Mild anemia. Troponin and BNP wnl.  UA noninfectious. Procal 0.05.  There was concern for PNA on CXR so antibiotics were given. Sarkar placed for bladder distension with some relief of pain.    CTA C/A/P showing Advanced aortoiliac atherosclerosis.  Severe stenosis versus intermittent occlusion of the superior mesenteric artery.  Significant stenosis of the iliofemoral arteries with probable occlusion of the right superficial femoral artery and severe stenosis of the left superficial femoral artery.  Evaluation of  small and medium arteries significantly limited by motion. Tree-in-bud nodules in the left lower lobe and possibly in the lingula.  Findings suggestive of small airway disease, noting that evaluation is limited due to significant respiratory motion. Right upper lobe dense bandlike and nodular consolidative opacities with atelectasis and bronchiectasis.  Findings may reflect chronic changes with superimposed infectious/inflammatory process. Dense right posterior pleural calcifications.  Differential to include sequela of asbestosis, postprocedural such as pleurodesis, or secondary to recurrent inflammatory/infectious process. Significantly distended urinary bladder.     Admitted to  for further management.     This encounter was done using a telephonic . The patient was given the opportunity to ask questions and everything was answered to her satisfaction. She voiced understanding of diagnosis and plan.   Aminah Dillon,  ID : [id number]

## 2024-03-03 NOTE — H&P
[facility] Dept  [facility] Medicine  History & Physical    Patient Name: Jing Velasquez  MRN: [id number]  Patient Class: IP- Inpatient  Admission Date: 3/2/2024  Attending Physician: Chacho Garcia MD   Primary Care Provider: [facility]         Patient information was obtained from patient, past medical records, and ER records.     Subjective:     Principal Problem:Peripheral arterial disease    Chief Complaint:   Chief Complaint   Patient presents with    Chest Pain     Sharp CP and abdominal pain since this morning, +sore throat, +cardiac hx, decreased PO intake per daughter         HPI: Jing Velasquez is a 83 y.o. [place] speaking female with history of diabetes, hypertension, ?trigeminal neuralgia on carbamazepine, poor appetite on megestrol who presents with her daughter for multiple complaints. She has left sided chest pain that radiates to her left shoulder. She thinks this has to do with how she sleeps on it. It is tender to the touch and reproducible on exam. Lying down makes it better and movement like sitting up makes it worse. She also has abdominal pain worse with position change as well. Her daughter reports poor appetite x 2 years and issues with her stomach. She denies nausea, vomiting, fever, chills, cough, SOB. She is very fatigued. She was recently diagnosed with PNA and took antibiotic and felt better but now feels exhausted again. She also has poorly localized pain either near her right groin/hip/bladder, unclear. It worsens with walking. Her daughter state does not walk much these days,.    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     In the ED, hypertensive. Otherwise AVFSS. EKG showed  nonspecific  ST abnormality, though repeat shows sinus tachycardia.   Lactic wnl. No leukocytosis. Mild anemia. Troponin and BNP wnl.  UA noninfectious. Procal 0.05.  There was concern for PNA on CXR so antibiotics were given. Sarkar placed for bladder distension with some relief of pain.    CTA C/A/P showing Advanced aortoiliac atherosclerosis.  Severe stenosis versus intermittent occlusion of the superior mesenteric artery.  Significant stenosis of the iliofemoral arteries with probable occlusion of the right superficial femoral artery and severe stenosis of the left superficial femoral artery.  Evaluation of small and medium arteries significantly limited by motion. Tree-in-bud nodules in the left lower lobe and possibly in the lingula.  Findings suggestive of small airway disease, noting that evaluation is limited due to significant respiratory motion. Right upper lobe dense bandlike and nodular consolidative opacities with atelectasis and bronchiectasis.  Findings may reflect chronic changes with superimposed infectious/inflammatory process. Dense right posterior pleural calcifications.  Differential to include sequela of asbestosis, postprocedural such as pleurodesis, or secondary to recurrent inflammatory/infectious process. Significantly distended urinary bladder.     Admitted to  for further management.     This encounter was done using a telephonic . The patient was given the opportunity to ask questions and everything was answered to her satisfaction. She voiced understanding of diagnosis and plan.   Aminah Dillon,  ID : [id number]     Past Medical History:   Diagnosis Date    Diabetes mellitus     Hypertension        History reviewed. No pertinent surgical history.    Review of patient's allergies indicates:  No Known Allergies    No current facility-administered medications on file prior to encounter.     Current Outpatient Medications on File Prior to Encounter    Medication Sig    atorvastatin (LIPITOR) 10 MG tablet Take 10 mg by mouth once daily.    carBAMazepine (TEGRETOL) 200 mg tablet Take 200 mg by mouth once daily.    losartan (COZAAR) 50 MG tablet Take 50 mg by mouth once daily.    megestroL (MEGACE) 400 mg/10 mL (40 mg/mL) Susp Take 400 mg by mouth.    metFORMIN (GLUCOPHAGE) 500 MG tablet Take 500 mg by mouth 2 (two) times daily.    pantoprazole (PROTONIX) 40 MG tablet Take 40 mg by mouth once daily.    diclofenac sodium (VOLTAREN) 1 % Gel Apply 2 g topically 4 (four) times daily.    diltiaZEM (CARDIZEM) 90 MG tablet Take 90 mg by mouth 4 (four) times daily.    glipiZIDE (GLUCOTROL) 5 MG TR24 Take 5 mg by mouth daily with breakfast.    pregabalin (LYRICA) 75 MG capsule Take 1 capsule (75 mg total) by mouth 2 (two) times daily.    SITagliptan-metformin (JANUMET) 50-1,000 mg per tablet Take 1 tablet by mouth 2 (two) times daily with meals.     Family History    None       Tobacco Use    Smoking status: Never    Smokeless tobacco: Never   Substance and Sexual Activity    Alcohol use: No    Drug use: Not on file    Sexual activity: Not on file     Review of Systems   Constitutional:  Positive for appetite change and fatigue.   HENT:  Positive for trouble swallowing (occasionally).    Eyes:  Negative for photophobia and visual disturbance.   Respiratory:  Negative for cough and shortness of breath.    Cardiovascular:  Positive for chest pain. Negative for leg swelling.   Gastrointestinal:  Positive for abdominal pain. Negative for diarrhea, nausea and vomiting.   Genitourinary:  Negative for dysuria and frequency.   Musculoskeletal:  Positive for arthralgias and gait problem.   Skin:  Negative for rash and wound.   Neurological:  Negative for light-headedness and headaches.   Psychiatric/Behavioral:  Negative for agitation and confusion.      Objective:     Vital Signs (Most Recent):  Temp: 97.8 °F (36.6 °C) (03/03/24 0500)  Pulse: 99 (03/03/24 0500)  Resp: 19 (03/03/24  0500)  BP: (!) 185/80 (03/03/24 0500)  SpO2: 99 % (03/03/24 0500) Vital Signs (24h Range):  Temp:  [97.4 °F (36.3 °C)-98.2 °F (36.8 °C)] 97.8 °F (36.6 °C)  Pulse:  [] 99  Resp:  [16-25] 19  SpO2:  [94 %-99 %] 99 %  BP: (119-185)/(58-80) 185/80        There is no height or weight on file to calculate BMI.     Physical Exam  Vitals and nursing note reviewed.   Constitutional:       Appearance: She is cachectic.   HENT:      Head: Normocephalic and atraumatic.      Nose: Nose normal.      Mouth/Throat:      Pharynx: No oropharyngeal exudate.   Eyes:      Extraocular Movements: Extraocular movements intact.      Conjunctiva/sclera: Conjunctivae normal.   Cardiovascular:      Rate and Rhythm: Normal rate and regular rhythm.      Heart sounds: Normal heart sounds.   Pulmonary:      Effort: Pulmonary effort is normal. No respiratory distress.      Breath sounds: Examination of the right-lower field reveals decreased breath sounds. Decreased breath sounds present.      Comments: TTP across left chest and near anterior aspect left shoulder   Chest:      Chest wall: Tenderness present.   Abdominal:      General: Bowel sounds are normal. There is no distension.      Palpations: Abdomen is soft.      Tenderness: There is abdominal tenderness.   Musculoskeletal:      Cervical back: Normal range of motion and neck supple.      Right lower leg: No edema.      Left lower leg: No edema.   Skin:     General: Skin is warm and dry.   Neurological:      Mental Status: She is alert and oriented to person, place, and time.      Motor: Weakness (generlaized weakness) present.   Psychiatric:         Mood and Affect: Mood normal.         Thought Content: Thought content normal.                Significant Labs: All pertinent labs within the past 24 hours have been reviewed.  Blood Culture:   Recent Labs   Lab 03/02/24  1950 03/02/24 1955   LABBLOO No Growth to date No Growth to date     BMP:   Recent Labs   Lab 03/02/24 1950   GLU  258*   *   K 4.7   CL 95   CO2 26   BUN 32*   CREATININE 0.8   CALCIUM 9.4   MG 1.7     CBC:   Recent Labs   Lab 03/02/24 1950   WBC 4.44   HGB 11.0*   HCT 33.9*        CMP:   Recent Labs   Lab 03/02/24 1950   *   K 4.7   CL 95   CO2 26   *   BUN 32*   CREATININE 0.8   CALCIUM 9.4   PROT 8.1   ALBUMIN 3.1*   BILITOT 0.1   ALKPHOS 115   AST 17   ALT 20   ANIONGAP 11     Cardiac Markers:   Recent Labs   Lab 03/02/24 1955   BNP 28     Magnesium:   Recent Labs   Lab 03/02/24 1950   MG 1.7       Troponin:   Recent Labs   Lab 03/02/24 1950   TROPONINI 0.009       Urine Studies:   Recent Labs   Lab 03/02/24  2354   COLORU Colorless*   APPEARANCEUA Clear   PHUR 8.0   SPECGRAV 1.020   PROTEINUA Negative   GLUCUA 4+*   KETONESU Negative   BILIRUBINUA Negative   OCCULTUA Negative   NITRITE Negative   LEUKOCYTESUR Negative   BACTERIA None       Significant Imaging: I have reviewed all pertinent imaging results/findings within the past 24 hours.  CTA Chest Abdomen Pelvis  Narrative: EXAMINATION:  CTA CHEST ABDOMEN PELVIS    CLINICAL HISTORY:  Aortic atherosclerosis;Concern for dissection, pt has chest and abdominal pain;    TECHNIQUE:  Initial noncontrast  images of the chest, abdomen and pelvis were obtained. Using 60 cc of Omnipaque 350 IV contrast material, and multi-detector helical CT technique, axial CT angiogram images of the chest, abdomen and pelvis were obtained.  MIP images were obtained.    COMPARISON:  Abdomen radiograph 03/02/2024, 08/11/2022    Lumbar spine radiograph 08/11/2022    Chest radiograph 07/19/2017    FINDINGS:  Evaluation is limited by extensive streak artifact due to the patient's arms overlying the field of view.  Exam quality is limited by severe motion artifact.  Evaluation also limited by paucity of intra-abdominal fat.    Thoracic soft tissues: 1.7 cm right thyroid gland nodule.    Heart: Coronary artery calcific atherosclerosis in a multi vessel distribution.   Trace pericardial fluid.    Nelia/Mediastinum: No significant lymphadenopathy.  No acute pulmonary embolus identified.    Lungs: Evaluation of the lung parenchyma limited due to respiratory motion.  Biapical fibronodular scarring.  Tree-in-bud nodules in the left lower lobe and possibly in the lingula several pleural based nodules in left lower lobe and lingula (series 2, image 212, 284).    Right upper lobe dense bandlike and nodular consolidative opacities.  Right upper lobe atelectasis and bronchiectasis.    Dense right posterior pleural dense calcifications.  Right-sided pleural thickening.    Liver: No focal lesions.    Gallbladder: Surgically absent.    Bile Ducts: Mild intrahepatic biliary ductal dilatation.  Common bile duct is dilated, expected post cholecystectomy.  Biliary distension is likely incidental in this patient with normal serum bilirubin level.    Pancreas: No pancreatic mass lesion or peripancreatic inflammatory change.    Spleen: Unremarkable.    Adrenals: Unremarkable.    Kidneys/ Ureters: Normal in size and enhance symmetrically.  No nephrolithiasis.  No hydroureteronephrosis.    Bladder: Moderately distended bladder.    Reproductive organs: Unremarkable.    GI Tract/Mesentery: Stomach is normal appearance.  Visualized loops of small and large bowel are normal in caliber without evidence for obstruction or inflammation.   Large volume stool burden.    No abdominopelvic ascites or intraperitoneal free air.    Abdominal wall: Unremarkable.    Bones: Diffuse osteopenia.  No acute displaced fracture.  T12 vertebral body compression deformity, which has progressed compared to lumbar spine radiograph 2022.  Sclerotic appearance of the T12 vertebral body suggest chronic fracture deformity or less likely underlying sclerotic lesion.    Postoperative changes of left hip internal fixation.    Vasculature: Left-sided two-vessel aortic arch without significant calcific atherosclerosis.  Thoracic and  abdominal aorta maintains appropriate caliber, contour, and course.  Extensive calcified and noncalcified aortoiliac atherosclerosis.  No dissection.  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  Severe stenosis versus intermittent occlusion of the superior mesenteric artery beyond the stent.  Significant iliofemoral atherosclerosis with occlusion of the right superficial femoral artery.  Significant stenosis of the left superficial femoral artery.    Portal veins, SMV and splenic vein are patent.  No portal venous gas.  Impression: 1. No aortic dissection as clinically questioned.  Advanced aortoiliac atherosclerosis.  Severe stenosis versus intermittent occlusion of the superior mesenteric artery.  Significant stenosis of the iliofemoral arteries with probable occlusion of the right superficial femoral artery and severe stenosis of the left superficial femoral artery.  Evaluation of small and medium arteries significantly limited by motion.  2. Overall limited quality study.  3. Tree-in-bud nodules in the left lower lobe and possibly in the lingula.  Findings suggestive of small airway disease, noting that evaluation is limited due to significant respiratory motion.  4. Right upper lobe dense bandlike and nodular consolidative opacities with atelectasis and bronchiectasis.  Findings may reflect chronic changes with superimposed infectious/inflammatory process.  5. Dense right posterior pleural calcifications.  Differential to include sequela of asbestosis, postprocedural such as pleurodesis, or secondary to recurrent inflammatory/infectious process.  6. Significantly distended urinary bladder.  Consider Sarkar catheter placement if the patient is unable to void spontaneously.  7. 1.7 cm right thyroid gland nodule.  Consider nonemergent outpatient ultrasound.  8. Additional findings discussed in the body of the report.  This report was flagged in Epic as abnormal.    Electronically signed by resident: Carmen  "Shahnaz  Date:    03/03/2024  Time:    00:39    Electronically signed by: Miah Velázquez MD  Date:    03/03/2024  Time:    02:11     Assessment/Plan:     Pneumonia involving right lung  Some concern for PNA on CXR. CT shows Tree-in-bud nodules in the left lower lobe and possibly in the lingula.  Findings suggestive of small airway disease, noting that evaluation is limited due to significant respiratory motion. Right upper lobe dense bandlike and nodular consolidative opacities with atelectasis and bronchiectasis.  Findings may reflect chronic changes with superimposed infectious/inflammatory process.Dense right posterior pleural calcifications.  Differential to include sequela of asbestosis, postprocedural such as pleurodesis, or secondary to recurrent inflammatory/infectious process.    -No leukocytosis, normal procal, and no fevers or hypoxia  -Patient/daughter reports she felt more energetic after a few days of antibiotics for PNA a few days ago and now back to feeling fatigued  -Continue empiric abx and supportive measures for now though currently low suspicions for bacterial PNA      Peripheral arterial disease  Abdominal pain   Lower extremity pain   -Possibly experiencing abdominal pain and leg pain from PVD  -CTA C/A/P Advanced aortoiliac atherosclerosis. Severe stenosis versus intermittent occlusion of the superior mesenteric artery. Significant stenosis of the iliofemoral arteries with probable occlusion of the right superficial femoral artery and severe stenosis of the left superficial femoral artery. Evaluation of small and medium arteries significantly limited by motion.   -Vascular surgery consulted, appreciate recommendations      Type 2 diabetes mellitus, without long-term current use of insulin  Patient's FSGs are controlled on current medication regimen.  Last A1c reviewed- No results found for: "LABA1C", "HGBA1C"  Most recent fingerstick glucose reviewed- No results for input(s): "POCTGLUCOSE" " in the last 24 hours.  Current correctional scale  Low  Maintain anti-hyperglycemic dose as follows-   Antihyperglycemics (From admission, onward)      Start     Stop Route Frequency Ordered    03/03/24 0516  insulin aspart U-100 pen 0-5 Units         -- SubQ Before meals & nightly PRN 03/03/24 0417          Oral hypoglycemics while patient is in the hospital.   On metformin outpatient       Hypertension  Chronic, uncontrolled. Latest blood pressure and vitals reviewed-     Temp:  [97.4 °F (36.3 °C)-98.2 °F (36.8 °C)]   Pulse:  []   Resp:  [16-25]   BP: (119-185)/(58-80)   SpO2:  [94 %-99 %] .   Home meds for hypertension were reviewed and noted below.   Hypertension Medications              Not taking    losartan (COZAAR) 50 MG tablet Take 50 mg by mouth once daily.        While in the hospital, will manage blood pressure as follows; Continue home antihypertensive regimen  Will utilize p.r.n. blood pressure medication only if patient's blood pressure greater than 180/110 and she develops symptoms such as worsening chest pain or shortness of breath.    Other chest pain  Appears to be MSK. Tender to palpaition/reprodiuble on exam  Check XR left shoulder  Tylenol and robaxin for pain  Repeat cardiac work up if indicated   BP control     Protein-calorie malnutrition  Nutrition consulted. Most recent weight and BMI monitored-     Measurements:  Wt Readings from Last 1 Encounters:   08/11/22 33.1 kg (73 lb)   There is no height or weight on file to calculate BMI.     -Continue Megastrol   -Poor PO intake x 2 years per daughter, tries ot give her ensure etc  -RD consult      VTE Risk Mitigation (From admission, onward)           Ordered     heparin (porcine) injection 5,000 Units  Every 8 hours         03/03/24 0417     IP VTE HIGH RISK PATIENT  Once         03/03/24 0417     Place sequential compression device  Until discontinued         03/03/24 0417                                    Sara Chavez,  WILFRID  Department of Hospital Medicine  [facility] Dept

## 2024-03-03 NOTE — HPI
"Jing Velasquez is a 83 y.o. [place] speaking female with history of diabetes, hypertension, previous SMA stent placed in [place] proximally 2 years ago for post prandial pain, now presenting with 2 years of "stomach fatigue" that occur with any ambulation.  States that her stomach feels heavy with ambulating.  Additionally, she notes that she is only able to ambulate approximately 10 she notices right greater than left lower extremity leg pain.  No pain at rest.    Nonsmoker.    Since placement of her SMA stent, she states that the pain she had with eating has completely resolved.  However she is as still a approximately 5 lb weight loss over the past year, secondary to overall poor appetite.    Vascular surgery consulted for evaluation of lower extremity claudication.     [id number] Bhumi used   "

## 2024-03-03 NOTE — ASSESSMENT & PLAN NOTE
Chronic, uncontrolled. Latest blood pressure and vitals reviewed-     Temp:  [97.4 °F (36.3 °C)-98.2 °F (36.8 °C)]   Pulse:  []   Resp:  [16-25]   BP: (119-185)/(58-80)   SpO2:  [94 %-99 %] .   Home meds for hypertension were reviewed and noted below.   Hypertension Medications              Not taking    losartan (COZAAR) 50 MG tablet Take 50 mg by mouth once daily.        While in the hospital, will manage blood pressure as follows; Continue home antihypertensive regimen  Will utilize p.r.n. blood pressure medication only if patient's blood pressure greater than 180/110 and she develops symptoms such as worsening chest pain or shortness of breath.

## 2024-03-03 NOTE — PROVIDER PROGRESS NOTES - EMERGENCY DEPT.
Imaging Results               CTA Chest Abdomen Pelvis (Final result)  Result time 03/03/24 02:11:36      Final result by Miah Velázquez MD (03/03/24 02:11:36)                   Impression:      1. No aortic dissection as clinically questioned.  Advanced aortoiliac atherosclerosis.  Severe stenosis versus intermittent occlusion of the superior mesenteric artery.  Significant stenosis of the iliofemoral arteries with probable occlusion of the right superficial femoral artery and severe stenosis of the left superficial femoral artery.  Evaluation of small and medium arteries significantly limited by motion.  2. Overall limited quality study.  3. Tree-in-bud nodules in the left lower lobe and possibly in the lingula.  Findings suggestive of small airway disease, noting that evaluation is limited due to significant respiratory motion.  4. Right upper lobe dense bandlike and nodular consolidative opacities with atelectasis and bronchiectasis.  Findings may reflect chronic changes with superimposed infectious/inflammatory process.  5. Dense right posterior pleural calcifications.  Differential to include sequela of asbestosis, postprocedural such as pleurodesis, or secondary to recurrent inflammatory/infectious process.  6. Significantly distended urinary bladder.  Consider Sarkar catheter placement if the patient is unable to void spontaneously.  7. 1.7 cm right thyroid gland nodule.  Consider nonemergent outpatient ultrasound.  8. Additional findings discussed in the body of the report.  This report was flagged in Epic as abnormal.    Electronically signed by resident: Carmen Christie  Date:    03/03/2024  Time:    00:39    Electronically signed by: Miah Velázquez MD  Date:    03/03/2024  Time:    02:11               Narrative:    EXAMINATION:  CTA CHEST ABDOMEN PELVIS    CLINICAL HISTORY:  Aortic atherosclerosis;Concern for dissection, pt has chest and abdominal pain;    TECHNIQUE:  Initial noncontrast  images  of the chest, abdomen and pelvis were obtained. Using 60 cc of Omnipaque 350 IV contrast material, and multi-detector helical CT technique, axial CT angiogram images of the chest, abdomen and pelvis were obtained.  MIP images were obtained.    COMPARISON:  Abdomen radiograph 03/02/2024, 08/11/2022    Lumbar spine radiograph 08/11/2022    Chest radiograph 07/19/2017    FINDINGS:  Evaluation is limited by extensive streak artifact due to the patient's arms overlying the field of view.  Exam quality is limited by severe motion artifact.  Evaluation also limited by paucity of intra-abdominal fat.    Thoracic soft tissues: 1.7 cm right thyroid gland nodule.    Heart: Coronary artery calcific atherosclerosis in a multi vessel distribution.  Trace pericardial fluid.    Nelia/Mediastinum: No significant lymphadenopathy.  No acute pulmonary embolus identified.    Lungs: Evaluation of the lung parenchyma limited due to respiratory motion.  Biapical fibronodular scarring.  Tree-in-bud nodules in the left lower lobe and possibly in the lingula several pleural based nodules in left lower lobe and lingula (series 2, image 212, 284).    Right upper lobe dense bandlike and nodular consolidative opacities.  Right upper lobe atelectasis and bronchiectasis.    Dense right posterior pleural dense calcifications.  Right-sided pleural thickening.    Liver: No focal lesions.    Gallbladder: Surgically absent.    Bile Ducts: Mild intrahepatic biliary ductal dilatation.  Common bile duct is dilated, expected post cholecystectomy.  Biliary distension is likely incidental in this patient with normal serum bilirubin level.    Pancreas: No pancreatic mass lesion or peripancreatic inflammatory change.    Spleen: Unremarkable.    Adrenals: Unremarkable.    Kidneys/ Ureters: Normal in size and enhance symmetrically.  No nephrolithiasis.  No hydroureteronephrosis.    Bladder: Moderately distended bladder.    Reproductive organs: Unremarkable.    GI  Tract/Mesentery: Stomach is normal appearance.  Visualized loops of small and large bowel are normal in caliber without evidence for obstruction or inflammation.   Large volume stool burden.    No abdominopelvic ascites or intraperitoneal free air.    Abdominal wall: Unremarkable.    Bones: Diffuse osteopenia.  No acute displaced fracture.  T12 vertebral body compression deformity, which has progressed compared to lumbar spine radiograph 2022.  Sclerotic appearance of the T12 vertebral body suggest chronic fracture deformity or less likely underlying sclerotic lesion.    Postoperative changes of left hip internal fixation.    Vasculature: Left-sided two-vessel aortic arch without significant calcific atherosclerosis.  Thoracic and abdominal aorta maintains appropriate caliber, contour, and course.  Extensive calcified and noncalcified aortoiliac atherosclerosis.  No dissection.  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  Severe stenosis versus intermittent occlusion of the superior mesenteric artery beyond the stent.  Significant iliofemoral atherosclerosis with occlusion of the right superficial femoral artery.  Significant stenosis of the left superficial femoral artery.    Portal veins, SMV and splenic vein are patent.  No portal venous gas.                                       X-Ray Chest AP Portable (Final result)  Result time 03/02/24 20:51:43      Final result by Damion Ball MD (03/02/24 20:51:43)                   Impression:      New diffuse airspace opacities in the right hemithorax.      Electronically signed by: Damion Ball MD  Date:    03/02/2024  Time:    20:51               Narrative:    EXAMINATION:  XR CHEST AP PORTABLE    CLINICAL HISTORY:  Sepsis;    TECHNIQUE:  Single frontal view of the chest was performed.    COMPARISON:  07/19/2017.    FINDINGS:  Monitoring EKG leads are present.  There are postoperative changes in the right upper abdominal quadrant.  There is a probable vascular stent in the upper  abdomen.    The trachea is unremarkable.  The cardiomediastinal silhouette is stable.  There is no evidence of free air beneath the hemidiaphragms.  There are no pleural effusions.  There is no evidence of a pneumothorax.  There are new diffuse airspace opacities in the right hemithorax.  There are degenerative changes in the osseous structures.                                     CTA negative for dissection.  She does have a largely distended urinary bladder.  Sarkar catheter was placed with improvement in symptoms, though she still endorses left-sided chest pain, suprapubic abdominal pain and generalized weakness, unable to walk.  Her CTA incidentally found significant stenosis in her left superficial femoral artery and superior mesenteric artery.  My suspicion for mesenteric ischemia or acute arterial occlusion is quite low.  Patient will definitely need vascular surgery follow-up.  UA is negative for infection.  Patient admitted to Hospital Medicine.

## 2024-03-03 NOTE — ED PROVIDER NOTES
Encounter Date: 3/2/2024       History     Chief Complaint   Patient presents with    Chest Pain     Sharp CP and abdominal pain since this morning, +sore throat, +cardiac hx, decreased PO intake per daughter      83-year-old female with type 2 diabetes and hypertension presents with a chief complaint of chest pain and abdominal pain.  She also endorses sore throat and decreased oral intake.  She is denying any fevers, however family a saying that she had a bowel movement this morning that was like pallets.  They are denying any blood in her stool.  The patient is denying any vomiting or dysuria.    The history is provided by the patient and a relative. The history is limited by a language barrier. A  was used.     Review of patient's allergies indicates:  No Known Allergies  Past Medical History:   Diagnosis Date    Diabetes mellitus     Hypertension      History reviewed. No pertinent surgical history.  History reviewed. No pertinent family history.  Social History     Tobacco Use    Smoking status: Never    Smokeless tobacco: Never   Substance Use Topics    Alcohol use: No     Review of Systems    Physical Exam     Initial Vitals [03/02/24 1912]   BP Pulse Resp Temp SpO2   (!) 119/58 110 16 97.4 °F (36.3 °C) 95 %      MAP       --         Physical Exam    Nursing note and vitals reviewed.  Constitutional: She appears well-developed. She has a sickly appearance.   Patient is moaning with every breath   HENT:   Head: Atraumatic.   Eyes: EOM are normal. Pupils are equal, round, and reactive to light.   Neck: Neck supple.   Cardiovascular:  Normal rate, regular rhythm, normal heart sounds and intact distal pulses.           Pulmonary/Chest: Breath sounds normal. She has no wheezes. She has no rhonchi. She has no rales. She exhibits no tenderness.   Abdominal: Abdomen is soft. She exhibits no distension and no mass. There is abdominal tenderness.   Patient has general abdominal tenderness There is  no rebound and no guarding.   Musculoskeletal:         General: No tenderness or edema. Normal range of motion.      Cervical back: Neck supple.     Neurological: She is alert and oriented to person, place, and time.   Skin: Skin is warm and dry. No rash noted. No erythema. No pallor.   Psychiatric: She has a normal mood and affect. Her behavior is normal. Judgment and thought content normal.         ED Course   Procedures  Labs Reviewed   CBC W/ AUTO DIFFERENTIAL - Abnormal; Notable for the following components:       Result Value    RBC 3.89 (*)     Hemoglobin 11.0 (*)     Hematocrit 33.9 (*)     RDW 21.3 (*)     Lymph # 0.9 (*)     All other components within normal limits   COMPREHENSIVE METABOLIC PANEL - Abnormal; Notable for the following components:    Sodium 132 (*)     Glucose 258 (*)     BUN 32 (*)     Albumin 3.1 (*)     All other components within normal limits   URINALYSIS, REFLEX TO URINE CULTURE - Abnormal; Notable for the following components:    Color, UA Colorless (*)     Glucose, UA 4+ (*)     All other components within normal limits    Narrative:     Specimen Source->Urine   POCT GLUCOSE, HAND-HELD DEVICE - Abnormal; Notable for the following components:    POC Glucose 114 (*)     All other components within normal limits   ISTAT PROCEDURE - Abnormal; Notable for the following components:    POC PH 7.470 (*)     POC PO2 25 (*)     POC HCO3 29.2 (*)     POC BE 6 (*)     POC TCO2 30 (*)     All other components within normal limits   CULTURE, BLOOD    Narrative:     Aerobic and anaerobic   CULTURE, BLOOD    Narrative:     Aerobic and anaerobic   HIV 1 / 2 ANTIBODY    Narrative:     Release to patient->Immediate   HEPATITIS C ANTIBODY    Narrative:     Release to patient->Immediate   MAGNESIUM   TROPONIN I   B-TYPE NATRIURETIC PEPTIDE   PROCALCITONIN   URINALYSIS MICROSCOPIC    Narrative:     Specimen Source->Urine   POCT GLUCOSE, HAND-HELD DEVICE   ISTAT LACTATE        ECG Results              EKG  12-lead (Final result)        Collection Time Result Time QRS Duration OHS QTC Calculation    03/02/24 19:43:22 03/03/24 08:50:39 84 442                     Final result by Interface, Lab In [facility] (03/03/24 08:50:45)                   Narrative:    Test Reason : R07.9,    Vent. Rate : 108 BPM     Atrial Rate : 108 BPM     P-R Int : 138 ms          QRS Dur : 084 ms      QT Int : 330 ms       P-R-T Axes : 065 071 077 degrees     QTc Int : 442 ms    Sinus tachycardia  Otherwise normal ECG  When compared with ECG of 02-MAR-2024 19:18,  ST no longer depressed in Inferior leads  Confirmed by Bong Hernandez MD ([id number]) on 3/3/2024 8:50:38 AM    Referred By: [email or web address]   SELF           Confirmed By:Bong Hernandez MD                                  Imaging Results              X-Ray Shoulder 2 or More Views Left (Final result)  Result time 03/03/24 08:01:34      Final result by Lul Wing DO (03/03/24 08:01:34)                   Impression:      No acute fracture or dislocation of the shoulder.      Electronically signed by: Lul Wing  Date:    03/03/2024  Time:    08:01               Narrative:    EXAMINATION:  XR SHOULDER COMPLETE 2 OR MORE VIEWS LEFT    CLINICAL HISTORY:  left shoulder tenderness;    TECHNIQUE:  Two or three views of the left shoulder were performed.    COMPARISON:  CT chest, abdomen, and pelvis from earlier today.    FINDINGS:  There is diffuse osteopenia.  There is no acute fracture or dislocation of the shoulder.  Alignment is normal.  The humeral head is well seated in the glenoid.  There is joint space narrowing of the acromioclavicular joint.  Remaining visualized osseous structures are intact.                                        CTA Chest Abdomen Pelvis (Final result)  Result time 03/03/24 02:11:36      Final result by Miah Velázquez MD (03/03/24 02:11:36)                   Impression:      1. No aortic dissection as clinically questioned.  Advanced aortoiliac atherosclerosis.   Severe stenosis versus intermittent occlusion of the superior mesenteric artery.  Significant stenosis of the iliofemoral arteries with probable occlusion of the right superficial femoral artery and severe stenosis of the left superficial femoral artery.  Evaluation of small and medium arteries significantly limited by motion.  2. Overall limited quality study.  3. Tree-in-bud nodules in the left lower lobe and possibly in the lingula.  Findings suggestive of small airway disease, noting that evaluation is limited due to significant respiratory motion.  4. Right upper lobe dense bandlike and nodular consolidative opacities with atelectasis and bronchiectasis.  Findings may reflect chronic changes with superimposed infectious/inflammatory process.  5. Dense right posterior pleural calcifications.  Differential to include sequela of asbestosis, postprocedural such as pleurodesis, or secondary to recurrent inflammatory/infectious process.  6. Significantly distended urinary bladder.  Consider Sarkar catheter placement if the patient is unable to void spontaneously.  7. 1.7 cm right thyroid gland nodule.  Consider nonemergent outpatient ultrasound.  8. Additional findings discussed in the body of the report.  This report was flagged in Epic as abnormal.    Electronically signed by resident: Carmen Christie  Date:    03/03/2024  Time:    00:39    Electronically signed by: Miah Velázquez MD  Date:    03/03/2024  Time:    02:11               Narrative:    EXAMINATION:  CTA CHEST ABDOMEN PELVIS    CLINICAL HISTORY:  Aortic atherosclerosis;Concern for dissection, pt has chest and abdominal pain;    TECHNIQUE:  Initial noncontrast  images of the chest, abdomen and pelvis were obtained. Using 60 cc of Omnipaque 350 IV contrast material, and multi-detector helical CT technique, axial CT angiogram images of the chest, abdomen and pelvis were obtained.  MIP images were obtained.    COMPARISON:  Abdomen radiograph 03/02/2024,  08/11/2022    Lumbar spine radiograph 08/11/2022    Chest radiograph 07/19/2017    FINDINGS:  Evaluation is limited by extensive streak artifact due to the patient's arms overlying the field of view.  Exam quality is limited by severe motion artifact.  Evaluation also limited by paucity of intra-abdominal fat.    Thoracic soft tissues: 1.7 cm right thyroid gland nodule.    Heart: Coronary artery calcific atherosclerosis in a multi vessel distribution.  Trace pericardial fluid.    Nelia/Mediastinum: No significant lymphadenopathy.  No acute pulmonary embolus identified.    Lungs: Evaluation of the lung parenchyma limited due to respiratory motion.  Biapical fibronodular scarring.  Tree-in-bud nodules in the left lower lobe and possibly in the lingula several pleural based nodules in left lower lobe and lingula (series 2, image 212, 284).    Right upper lobe dense bandlike and nodular consolidative opacities.  Right upper lobe atelectasis and bronchiectasis.    Dense right posterior pleural dense calcifications.  Right-sided pleural thickening.    Liver: No focal lesions.    Gallbladder: Surgically absent.    Bile Ducts: Mild intrahepatic biliary ductal dilatation.  Common bile duct is dilated, expected post cholecystectomy.  Biliary distension is likely incidental in this patient with normal serum bilirubin level.    Pancreas: No pancreatic mass lesion or peripancreatic inflammatory change.    Spleen: Unremarkable.    Adrenals: Unremarkable.    Kidneys/ Ureters: Normal in size and enhance symmetrically.  No nephrolithiasis.  No hydroureteronephrosis.    Bladder: Moderately distended bladder.    Reproductive organs: Unremarkable.    GI Tract/Mesentery: Stomach is normal appearance.  Visualized loops of small and large bowel are normal in caliber without evidence for obstruction or inflammation.   Large volume stool burden.    No abdominopelvic ascites or intraperitoneal free air.    Abdominal wall:  Unremarkable.    Bones: Diffuse osteopenia.  No acute displaced fracture.  T12 vertebral body compression deformity, which has progressed compared to lumbar spine radiograph 2022.  Sclerotic appearance of the T12 vertebral body suggest chronic fracture deformity or less likely underlying sclerotic lesion.    Postoperative changes of left hip internal fixation.    Vasculature: Left-sided two-vessel aortic arch without significant calcific atherosclerosis.  Thoracic and abdominal aorta maintains appropriate caliber, contour, and course.  Extensive calcified and noncalcified aortoiliac atherosclerosis.  No dissection.  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  Severe stenosis versus intermittent occlusion of the superior mesenteric artery beyond the stent.  Significant iliofemoral atherosclerosis with occlusion of the right superficial femoral artery.  Significant stenosis of the left superficial femoral artery.    Portal veins, SMV and splenic vein are patent.  No portal venous gas.                                       X-Ray Chest AP Portable (Final result)  Result time 03/02/24 20:51:43      Final result by Damion Ball MD (03/02/24 20:51:43)                   Impression:      New diffuse airspace opacities in the right hemithorax.      Electronically signed by: Damion Ball MD  Date:    03/02/2024  Time:    20:51               Narrative:    EXAMINATION:  XR CHEST AP PORTABLE    CLINICAL HISTORY:  Sepsis;    TECHNIQUE:  Single frontal view of the chest was performed.    COMPARISON:  07/19/2017.    FINDINGS:  Monitoring EKG leads are present.  There are postoperative changes in the right upper abdominal quadrant.  There is a probable vascular stent in the upper abdomen.    The trachea is unremarkable.  The cardiomediastinal silhouette is stable.  There is no evidence of free air beneath the hemidiaphragms.  There are no pleural effusions.  There is no evidence of a pneumothorax.  There are new diffuse airspace opacities in the right  hemithorax.  There are degenerative changes in the osseous structures.                                       Medications   sodium chloride 0.9% flush 5 mL (has no administration in time range)   albuterol-ipratropium 2.5 mg-0.5 mg/3 mL nebulizer solution 3 mL (has no administration in time range)   melatonin tablet 6 mg (has no administration in time range)   ondansetron disintegrating tablet 8 mg (has no administration in time range)   polyethylene glycol packet 17 g (has no administration in time range)   bisacodyL suppository 10 mg (has no administration in time range)   simethicone chewable tablet 80 mg (has no administration in time range)   aluminum-magnesium hydroxide-simethicone 200-200-20 mg/5 mL suspension 30 mL (has no administration in time range)   acetaminophen tablet 650 mg (has no administration in time range)   naloxone 0.4 mg/mL injection 0.02 mg (has no administration in time range)   glucose chewable tablet 16 g (has no administration in time range)   glucose chewable tablet 24 g (has no administration in time range)   glucagon (human recombinant) injection 1 mg (has no administration in time range)   heparin (porcine) injection 5,000 Units (has no administration in time range)   insulin aspart U-100 pen 0-5 Units (has no administration in time range)   dextrose 10% bolus 125 mL 125 mL (has no administration in time range)   dextrose 10% bolus 250 mL 250 mL (has no administration in time range)   methocarbamoL tablet 500 mg (500 mg Oral Given 3/3/24 0700)   acetaminophen tablet 1,000 mg (1,000 mg Oral Given 3/3/24 0723)   pantoprazole EC tablet 40 mg (40 mg Oral Given 3/3/24 0910)   megestroL 400 mg/10 mL (10 mL) suspension 400 mg (400 mg Oral Given 3/3/24 1302)   carBAMazepine tablet 200 mg (200 mg Oral Given 3/3/24 0910)   atorvastatin tablet 10 mg (10 mg Oral Given 3/3/24 0910)   losartan tablet 50 mg (50 mg Oral Given 3/3/24 0724)   hydrALAZINE injection 5 mg (has no administration in time  range)   cefTRIAXone (Rocephin) 1 g in dextrose 5 % in water (D5W) 100 mL IVPB (MB+) (0 g Intravenous Stopped 3/3/24 0940)   azithromycin tablet 250 mg (250 mg Oral Given 3/3/24 0911)   lactated ringers bolus 500 mL (0 mLs Intravenous Stopped 3/2/24 2138)   morphine injection 2 mg (2 mg Intravenous Given 3/2/24 2010)   ondansetron injection 4 mg (4 mg Intravenous Given 3/2/24 2009)   cefTRIAXone (ROCEPHIN) 2 g in dextrose 5 % in water (D5W) 100 mL IVPB (MB+) (0 g Intravenous Stopped 3/2/24 2300)   iohexoL (OMNIPAQUE 350) injection 60 mL (60 mLs Intravenous Given 3/3/24 0037)     Medical Decision Making  See ED course for remainder of care    Amount and/or Complexity of Data Reviewed  Labs: ordered. Decision-making details documented in ED Course.  Radiology: ordered. Decision-making details documented in ED Course.  ECG/medicine tests:  Decision-making details documented in ED Course.    Risk  Prescription drug management.  Decision regarding hospitalization.               ED Course as of 03/03/24 1445   Sat Mar 02, 2024   2332 83-year-old female in no acute distress.  I treated her pain with morphine.  Differential includes but is not limited to ACS versus sepsis versus pneumonia versus stercoral colitis versus constipation [BP]   2332 EKG 12-lead  Sinus tachycardia at 108 beats per minute, all intervals within normal limits, no STEMI [BP]   2333 POC PH(!): 7.470  No acidosis [BP]   2333 POC Lactate: 1.68  Within normal limits [BP]   2333 WBC: 4.44  No leukocytosis [BP]   2333 Creatinine: 0.8  At baseline [BP]   2333 Glucose(!): 258  Hyperglycemia without evidence of acidosis [BP]   2334 X-Ray Chest AP Portable  Right-sided infiltrate, ceftriaxone ordered [BP]   2334 Patient was signed out to oncoming team with CTA chest abdomen pelvis pending and likely admission to Hospital Medicine for pneumonia. [BP]      ED Course User Index  [BP] Huy Hill MD                           Clinical Impression:  Final  diagnoses:  [R07.9] Chest pain  [J18.9] Pneumonia of right lung due to infectious organism, unspecified part of lung (Primary)  [R10.9] Abdominal pain, unspecified abdominal location          ED Disposition Condition    Admit                 Huy Hill MD  Resident  03/02/24 [id number]       Huy Hill MD  Resident  03/02/24 [id number]       Deepak Sanders MD  03/03/24 [id number]

## 2024-03-03 NOTE — CONSULTS
"[facility] - Emergency Dept  Vascular Surgery  Consult Note    Inpatient consult to Vascular Surgery  Consult performed by: Ronnie Purvis MD  Consult ordered by: Sara Chavez PA-C        Subjective:     Chief Complaint/Reason for Admission: Lower extremity life-limiting claudication    History of Present Illness: Jing Velasquez is a 83 y.o. Chinese speaking female with history of diabetes, hypertension, previous SMA stent placed in [place] proximally 2 years ago for post prandial pain, now presenting with 2 years of "stomach fatigue" that occur with any ambulation.  States that her stomach feels heavy with ambulating.  Additionally, she notes that she is only able to ambulate approximately 10 she notices right greater than left lower extremity leg pain.  No pain at rest.    Nonsmoker.    Since placement of her SMA stent, she states that the pain she had with eating has completely resolved.  However she is as still a approximately 5 lb weight loss over the past year, secondary to overall poor appetite.    Vascular surgery consulted for evaluation of lower extremity claudication.     [id number] Bhumi used     (Not in a hospital admission)      Review of patient's allergies indicates:  No Known Allergies    Past Medical History:   Diagnosis Date    Diabetes mellitus     Hypertension      History reviewed. No pertinent surgical history.  Family History    None       Tobacco Use    Smoking status: Never    Smokeless tobacco: Never   Substance and Sexual Activity    Alcohol use: No    Drug use: Not on file    Sexual activity: Not on file     Review of Systems   Constitutional:  Positive for appetite change and fatigue.   HENT:  Negative for trouble swallowing (occasionally).    Respiratory:  Negative for cough and shortness of breath.    Gastrointestinal:  Positive for abdominal pain. Negative for diarrhea, nausea and vomiting.   Genitourinary:  Negative for dysuria and frequency. " "  Musculoskeletal:  Positive for arthralgias and gait problem.   Skin:  Negative for rash and wound.   Neurological:  Negative for weakness, light-headedness, numbness and headaches.   Psychiatric/Behavioral:  Negative for agitation and confusion.      Objective:     Vital Signs (Most Recent):  Temp: 98.6 °F (37 °C) (03/03/24 1635)  Pulse: 88 (03/03/24 1702)  Resp: 20 (03/03/24 1702)  BP: (!) 170/71 (03/03/24 1702)  SpO2: 97 % (03/03/24 1702) Vital Signs (24h Range):  Temp:  [97.4 °F (36.3 °C)-98.6 °F (37 °C)] 98.6 °F (37 °C)  Pulse:  [] 88  Resp:  [16-25] 20  SpO2:  [94 %-100 %] 97 %  BP: (119-202)/(58-91) 170/71        There is no height or weight on file to calculate BMI.      Physical Exam  Constitutional:       Comments: Small stature   HENT:      Head: Normocephalic.   Eyes:      Pupils: Pupils are equal, round, and reactive to light.   Cardiovascular:      Rate and Rhythm: Normal rate.      Pulses:           Femoral pulses are 1+ on the right side and 1+ on the left side.     Comments: Right monophasic DP signal present  Left biphasic DP, monophasic PT signal present.  Musculoskeletal:         General: No swelling, tenderness or signs of injury.      Right lower leg: No edema.      Left lower leg: No edema.   Neurological:      General: No focal deficit present.      Mental Status: She is alert.          Significant Labs:  All pertinent labs from the last 24 hours have been reviewed.    Significant Diagnostics:  I have reviewed all pertinent imaging results/findings within the past 24 hours.  Assessment/Plan:     * Peripheral arterial disease  Jing Velasquez is a 83 y.o. 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     - No rest pain, no evidence of lower extremity wounds.   - No " further inpatient workup required from Vascular perspective, Outpatient follow up with ADENIKE/PVRs and mesenteric duplex with Dr. Greenfield, clinic message sent.         Thank you for your consult.     Ronnie Purvis MD  Vascular Surgery  [facility] - Emergency Dept

## 2024-03-03 NOTE — ED NOTES
Patient identifiers for Jing Velasquez 83 y.o. female checked and correct.  Chief Complaint   Patient presents with    Chest Pain     Sharp CP and abdominal pain since this morning, +sore throat, +cardiac hx, decreased PO intake per daughter      Past Medical History:   Diagnosis Date    Diabetes mellitus     Hypertension      Allergies reported: Review of patient's allergies indicates:  No Known Allergies    Appearance: Pt awake, alert & oriented to person, place & time. Pt in no acute distress at present time. Pt is clean and well groomed with clothes appropriately fastened.   Skin: Skin warm, dry & intact. Color consistent with ethnicity. Mucous membranes moist. No breakdown or brusing noted.   Musculoskeletal: Patient moving all extremities well, no obvious swelling or deformities noted.   Respiratory: Respirations spontaneous, even, and non-labored. Visible chest rise noted. Airway is open and patent. No accessory muscle use noted.   Neurologic: Sensation is intact. Speech is clear and appropriate. Eyes open spontaneously, behavior appropriate to situation, follows commands, facial expression symmetrical, bilateral hand grasp equal and even, purposeful motor response noted.  Cardiac: All peripheral pulses present. No Bilateral lower extremity edema. Cap refill is <3 seconds.  Abdomen: Abdomen soft, non distended, non tender to palpation.   : Pt voids independently, denies dysuria, hematuria, frequency.

## 2024-03-03 NOTE — ED NOTES
Took report from Isabelle VELAZQUEZ, and assumed care of pt at this time. Pt resting comfortably and independently repositioned in stretcher with bed locked in lowest position for safety. NAD noted at this time. Respirations even and unlabored and visible chest rise noted.  Patient offered bathroom assistance and denies need at this time. Pt instructed to call if assistance is needed. Pt on continuous cardiac, BP, and O2 monitoring. Call light within reach. Family at bedside. No needs at this time. Will continue to monitor.

## 2024-03-03 NOTE — ED TRIAGE NOTES
Jing Velasquez, a 83 y.o. female presents to the ED w/ complaint of chest pain on and off    Triage note:  Chief Complaint   Patient presents with    Chest Pain     Sharp CP and abdominal pain since this morning, +sore throat, +cardiac hx, decreased PO intake per daughter      Review of patient's allergies indicates:  No Known Allergies  Past Medical History:   Diagnosis Date    Diabetes mellitus     Hypertension        
27yo F  17wks 1d by dates/US following w GYN on [address] pw fevers, chills, nausea/vomiting/diarrhea, and R flank pain since Tues- was at OB on Tues for prenatal check, received a flu shot, and since then has been co these sx- hasn't been able to tolerate any PO since, 4-5episodes of diarrhea daily. non-bloody, non-bilious. No recent travel/abx. No dysuria/hematuria. No chest pain, shortness of breath, headache vision changes, abdominal pain, LOF, vaginal bleeding, numbness/focal weakness, no other complaints

## 2024-03-04 VITALS
DIASTOLIC BLOOD PRESSURE: 74 MMHG | HEIGHT: 59 IN | WEIGHT: 66 LBS | SYSTOLIC BLOOD PRESSURE: 163 MMHG | RESPIRATION RATE: 16 BRPM | BODY MASS INDEX: 13.31 KG/M2 | TEMPERATURE: 97 F | HEART RATE: 98 BPM | OXYGEN SATURATION: 94 %

## 2024-03-04 PROBLEM — E43 SEVERE PROTEIN-CALORIE MALNUTRITION: Status: ACTIVE | Noted: 2024-03-03

## 2024-03-04 LAB
ANION GAP SERPL CALC-SCNC: 10 MMOL/L (ref 8–16)
BASOPHILS # BLD AUTO: 0.01 K/UL (ref 0–0.2)
BASOPHILS NFR BLD: 0.3 % (ref 0–1.9)
BUN SERPL-MCNC: 23 MG/DL (ref 8–23)
CALCIUM SERPL-MCNC: 9.5 MG/DL (ref 8.7–10.5)
CHLORIDE SERPL-SCNC: 103 MMOL/L (ref 95–110)
CO2 SERPL-SCNC: 25 MMOL/L (ref 23–29)
CREAT SERPL-MCNC: 0.8 MG/DL (ref 0.5–1.4)
DIFFERENTIAL METHOD BLD: ABNORMAL
EOSINOPHIL # BLD AUTO: 0.1 K/UL (ref 0–0.5)
EOSINOPHIL NFR BLD: 1.9 % (ref 0–8)
ERYTHROCYTE [DISTWIDTH] IN BLOOD BY AUTOMATED COUNT: 20.9 % (ref 11.5–14.5)
EST. GFR  (NO RACE VARIABLE): >60 ML/MIN/1.73 M^2
ESTIMATED AVG GLUCOSE: 197 MG/DL (ref 68–131)
GLUCOSE SERPL-MCNC: 243 MG/DL (ref 70–110)
HBA1C MFR BLD: 8.5 % (ref 4–5.6)
HCT VFR BLD AUTO: 34.6 % (ref 37–48.5)
HGB BLD-MCNC: 10.9 G/DL (ref 12–16)
IMM GRANULOCYTES # BLD AUTO: 0 K/UL (ref 0–0.04)
IMM GRANULOCYTES NFR BLD AUTO: 0 % (ref 0–0.5)
LYMPHOCYTES # BLD AUTO: 0.9 K/UL (ref 1–4.8)
LYMPHOCYTES NFR BLD: 24.2 % (ref 18–48)
MCH RBC QN AUTO: 26.8 PG (ref 27–31)
MCHC RBC AUTO-ENTMCNC: 31.5 G/DL (ref 32–36)
MCV RBC AUTO: 85 FL (ref 82–98)
MONOCYTES # BLD AUTO: 0.4 K/UL (ref 0.3–1)
MONOCYTES NFR BLD: 11.1 % (ref 4–15)
NEUTROPHILS # BLD AUTO: 2.3 K/UL (ref 1.8–7.7)
NEUTROPHILS NFR BLD: 62.5 % (ref 38–73)
NRBC BLD-RTO: 0 /100 WBC
PLATELET # BLD AUTO: 412 K/UL (ref 150–450)
PMV BLD AUTO: 10 FL (ref 9.2–12.9)
POCT GLUCOSE: 181 MG/DL (ref 70–110)
POCT GLUCOSE: 220 MG/DL (ref 70–110)
POTASSIUM SERPL-SCNC: 4 MMOL/L (ref 3.5–5.1)
RBC # BLD AUTO: 4.06 M/UL (ref 4–5.4)
SODIUM SERPL-SCNC: 138 MMOL/L (ref 136–145)
WBC # BLD AUTO: 3.6 K/UL (ref 3.9–12.7)

## 2024-03-04 PROCEDURE — 63700000 PHARM REV CODE 250 ALT 637 W/O HCPCS

## 2024-03-04 PROCEDURE — 83036 HEMOGLOBIN GLYCOSYLATED A1C: CPT

## 2024-03-04 PROCEDURE — 85025 COMPLETE CBC W/AUTO DIFF WBC: CPT

## 2024-03-04 PROCEDURE — S0179 MEGESTROL 20 MG: HCPCS

## 2024-03-04 PROCEDURE — 25000003 PHARM REV CODE 250

## 2024-03-04 PROCEDURE — 80048 BASIC METABOLIC PNL TOTAL CA: CPT

## 2024-03-04 PROCEDURE — 99223 1ST HOSP IP/OBS HIGH 75: CPT | Mod: ,,, | Performed by: SURGERY

## 2024-03-04 PROCEDURE — 36415 COLL VENOUS BLD VENIPUNCTURE: CPT

## 2024-03-04 PROCEDURE — 25000003 PHARM REV CODE 250: Performed by: STUDENT IN AN ORGANIZED HEALTH CARE EDUCATION/TRAINING PROGRAM

## 2024-03-04 PROCEDURE — 63600175 PHARM REV CODE 636 W HCPCS

## 2024-03-04 RX ORDER — LOSARTAN POTASSIUM 50 MG/1
50 TABLET ORAL ONCE
Status: COMPLETED | OUTPATIENT
Start: 2024-03-04 | End: 2024-03-04

## 2024-03-04 RX ORDER — ATORVASTATIN CALCIUM 80 MG/1
80 TABLET, FILM COATED ORAL DAILY
Qty: 90 TABLET | Refills: 3 | Status: SHIPPED | OUTPATIENT
Start: 2024-03-04 | End: 2024-05-20

## 2024-03-04 RX ORDER — ASPIRIN 81 MG/1
81 TABLET ORAL DAILY
Qty: 30 TABLET | Refills: 0 | Status: SHIPPED | OUTPATIENT
Start: 2024-03-04 | End: 2024-05-20

## 2024-03-04 RX ORDER — LOSARTAN POTASSIUM 100 MG/1
100 TABLET ORAL DAILY
Qty: 90 TABLET | Refills: 3 | Status: SHIPPED | OUTPATIENT
Start: 2024-03-05 | End: 2025-03-05

## 2024-03-04 RX ORDER — LOSARTAN POTASSIUM 50 MG/1
100 TABLET ORAL DAILY
Status: DISCONTINUED | OUTPATIENT
Start: 2024-03-05 | End: 2024-03-04 | Stop reason: HOSPADM

## 2024-03-04 RX ORDER — CEFPODOXIME PROXETIL 100 MG/1
100 TABLET, FILM COATED ORAL 2 TIMES DAILY
Qty: 6 TABLET | Refills: 0 | Status: SHIPPED | OUTPATIENT
Start: 2024-03-04 | End: 2024-03-18

## 2024-03-04 RX ORDER — AZITHROMYCIN 250 MG/1
250 TABLET, FILM COATED ORAL DAILY
Qty: 3 TABLET | Refills: 0 | Status: SHIPPED | OUTPATIENT
Start: 2024-03-05 | End: 2024-03-18

## 2024-03-04 RX ADMIN — LOSARTAN POTASSIUM 50 MG: 50 TABLET, FILM COATED ORAL at 06:03

## 2024-03-04 RX ADMIN — METHOCARBAMOL 500 MG: 500 TABLET ORAL at 08:03

## 2024-03-04 RX ADMIN — ACETAMINOPHEN 650 MG: 325 TABLET ORAL at 09:03

## 2024-03-04 RX ADMIN — ACETAMINOPHEN 1000 MG: 500 TABLET ORAL at 03:03

## 2024-03-04 RX ADMIN — ATORVASTATIN CALCIUM 10 MG: 10 TABLET, FILM COATED ORAL at 08:03

## 2024-03-04 RX ADMIN — PANTOPRAZOLE SODIUM 40 MG: 40 TABLET, DELAYED RELEASE ORAL at 08:03

## 2024-03-04 RX ADMIN — HYDRALAZINE HYDROCHLORIDE 5 MG: 20 INJECTION, SOLUTION INTRAMUSCULAR; INTRAVENOUS at 02:03

## 2024-03-04 RX ADMIN — INSULIN ASPART 2 UNITS: 100 INJECTION, SOLUTION INTRAVENOUS; SUBCUTANEOUS at 12:03

## 2024-03-04 RX ADMIN — MEGESTROL ACETATE 400 MG: 400 SUSPENSION ORAL at 10:03

## 2024-03-04 RX ADMIN — LOSARTAN POTASSIUM 50 MG: 50 TABLET, FILM COATED ORAL at 10:03

## 2024-03-04 RX ADMIN — AZITHROMYCIN DIHYDRATE 250 MG: 250 TABLET ORAL at 08:03

## 2024-03-04 RX ADMIN — HEPARIN SODIUM 5000 UNITS: 5000 INJECTION INTRAVENOUS; SUBCUTANEOUS at 06:03

## 2024-03-04 RX ADMIN — HEPARIN SODIUM 5000 UNITS: 5000 INJECTION INTRAVENOUS; SUBCUTANEOUS at 03:03

## 2024-03-04 RX ADMIN — METHOCARBAMOL 500 MG: 500 TABLET ORAL at 03:03

## 2024-03-04 RX ADMIN — CEFTRIAXONE 1 G: 1 INJECTION, POWDER, FOR SOLUTION INTRAMUSCULAR; INTRAVENOUS at 09:03

## 2024-03-04 RX ADMIN — CARBAMAZEPINE 200 MG: 200 TABLET ORAL at 08:03

## 2024-03-04 NOTE — DISCHARGE SUMMARY
[facility] Medicine  Discharge Summary      Patient Name: Jing Velasquez  MRN: [id number]  MALIA: [id number]  Patient Class: IP- Inpatient  Admission Date: 3/2/2024  Hospital Length of Stay: 1 days  Discharge Date and Time:  03/04/2024 11:48 AM  Attending Physician: Chacho Garcia MD   Discharging Provider: Chacho Garcia MD  Primary Care Provider: [facility] Primary Doctor  [facility] Medicine Team: [facility] Chacho Garcia MD  Primary Care Team: [facility]    HPI:   Jing Velasquez is a 83 y.o. Greek speaking female with history of diabetes, hypertension, ?trigeminal neuralgia on carbamazepine, poor appetite on megestrol who presents with her daughter for multiple complaints. She has left sided chest pain that radiates to her left shoulder. She thinks this has to do with how she sleeps on it. It is tender to the touch and reproducible on exam. Lying down makes it better and movement like sitting up makes it worse. She also has abdominal pain worse with position change as well. Her daughter reports poor appetite x 2 years and issues with her stomach. She denies nausea, vomiting, fever, chills, cough, SOB. She is very fatigued. She was recently diagnosed with PNA and took antibiotic and felt better but now feels exhausted again. She also has poorly localized pain either near her right groin/hip/bladder, unclear. It worsens with walking. Her daughter state does not walk much these days,.    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     In the ED, hypertensive. Otherwise AVFSS. EKG showed nonspecific  ST abnormality, though repeat shows sinus tachycardia.   Lactic wnl. No leukocytosis. Mild anemia. Troponin and  BNP wnl.  UA noninfectious. Procal 0.05.  There was concern for PNA on CXR so antibiotics were given. Sarkar placed for bladder distension with some relief of pain.    CTA C/A/P showing Advanced aortoiliac atherosclerosis.  Severe stenosis versus intermittent occlusion of the superior mesenteric artery.  Significant stenosis of the iliofemoral arteries with probable occlusion of the right superficial femoral artery and severe stenosis of the left superficial femoral artery.  Evaluation of small and medium arteries significantly limited by motion. Tree-in-bud nodules in the left lower lobe and possibly in the lingula.  Findings suggestive of small airway disease, noting that evaluation is limited due to significant respiratory motion. Right upper lobe dense bandlike and nodular consolidative opacities with atelectasis and bronchiectasis.  Findings may reflect chronic changes with superimposed infectious/inflammatory process. Dense right posterior pleural calcifications.  Differential to include sequela of asbestosis, postprocedural such as pleurodesis, or secondary to recurrent inflammatory/infectious process. Significantly distended urinary bladder.     Admitted to  for further management.     This encounter was done using a telephonic . The patient was given the opportunity to ask questions and everything was answered to her satisfaction. She voiced understanding of diagnosis and plan.   Aminah Dillon,  ID : [id number]     * No surgery found *      Hospital Course:   Patient seen and examined at bedside. Used medical interpretter #[id number] for Irish interpretation. Patient saturating well on RA. Reports cough with occasional sputum production. CTX and azithro ordered for CAP coverage.Switched to cefpodoxime on d/c. Reports on walking stomach feels tights and her legs feel very tired. CTA  A/P showing Advanced aortoiliac atherosclerosis.  Severe stenosis versus intermittent  occlusion of the superior mesenteric artery.  Significant stenosis of the iliofemoral arteries with probable occlusion of the right superficial femoral artery and severe stenosis of the left superficial femoral artery.  Evaluation of small and medium arteries significantly limited by motion. Vascular surgery consulted. Given no rest pain, no evidence of lower extremity wounds, No further inpatient workup required from Vascular perspective, Outpatient follow up with ADENIKE/PVRs and mesenteric duplex with Dr. Greenfield. Started aspirin 81mg daily and increase lipitor to 80mg daily. BP noted to be in 160's and 180's. Increased losartan to 100mg daily. Reports home BP also ranges in 160's. Advised to log BP at home and fu with PCP.  Patient is currently medically and HDS. She is being d/c home. FU with PCP and vascular surgery as OP. ER precautions given. Plan of care discussed with patient and family at bedside, verbalized understanding. All questions were answered.     Goals of Care Treatment Preferences:  Code Status: Full Code      Consults:   Consults (From admission, onward)          Status Ordering Provider     Inpatient consult to Vascular Surgery  Once        Provider:  (Not yet assigned)    Completed BRANDO FOY     Inpatient consult to Registered Dietitian/Nutritionist  Once        Provider:  (Not yet assigned)    Acknowledged BRANDO FOY            No new Assessment & Plan notes have been filed under this hospital service since the last note was generated.  Service: Hospital Medicine    Final Active Diagnoses:    Diagnosis Date Noted POA    PRINCIPAL PROBLEM:  Peripheral arterial disease [I73.9] 03/03/2024 Yes    Protein-calorie malnutrition [E46] 03/03/2024 Yes    Other chest pain [R07.89] 03/03/2024 Yes    Hypertension [I10] 03/03/2024 Yes    Type 2 diabetes mellitus, without long-term current use of insulin [E11.9] 03/03/2024 Yes    Pneumonia involving right lung [J18.9] 03/03/2024 Yes     Abdominal pain [R10.9] 03/03/2024 Yes    Lower extremity pain [M79.606] 03/03/2024 Yes      Problems Resolved During this Admission:       Discharged Condition: stable    Disposition: Home or Self Care    Follow Up:   Follow-up Information       No, Primary Doctor Follow up in 1 week(s).                           Patient Instructions:      Ambulatory referral/consult to Vascular Surgery   Standing Status: Future   Referral Priority: Routine Referral Type: Consultation   Referral Reason: Specialty Services Required   Requested Specialty: Vascular Surgery   Number of Visits Requested: 1       Significant Diagnostic Studies: N/A    Pending Diagnostic Studies:       None           Medications:  Reconciled Home Medications:      Medication List        START taking these medications      aspirin 81 MG EC tablet  Commonly known as: ECOTRIN  [place] 1 tableta (81 mg en total) por vía oral carolynn vez al día.  (Take 1 tablet (81 mg total) by mouth once daily.)     azithromycin 250 MG tablet  Commonly known as: Z-NAVEEN  Take 1 tablet (250 mg total) by mouth once daily. for 3 days  Start taking on: March 5, 2024     cefpodoxime 100 MG tablet  Commonly known as: VANTIN  Take 1 tablet (100 mg total) by mouth 2 (two) times daily. for 3 days            CHANGE how you take these medications      atorvastatin 80 MG tablet  Commonly known as: LIPITOR  [place]ynn tableta (80 mg en total) por vía oral diariamente.  (Take 1 tablet (80 mg total) by mouth once daily.)  What changed:   medication strength  how much to take     losartan 100 MG tablet  Commonly known as: COZAAR  [place]ynn tableta (100 mg en total) por vía oral diariamente.  (Take 1 tablet (100 mg total) by mouth once daily.)  Start taking on: March 5, 2024  What changed:   medication strength  how much to take            CONTINUE taking these medications      carBAMazepine 200 mg tablet  Commonly known as: TEGRETOL  Take 200 mg by mouth once daily.     diclofenac sodium 1 %  Gel  Commonly known as: VOLTAREN  Apply 2 g topically 4 (four) times daily.     diltiaZEM 90 MG tablet  Commonly known as: CARDIZEM  Take 90 mg by mouth 4 (four) times daily.     glipiZIDE 5 MG Tr24  Take 5 mg by mouth daily with breakfast.     megestroL 400 mg/10 mL (40 mg/mL) Susp  Commonly known as: MEGACE  Take 400 mg by mouth.     metFORMIN 500 MG tablet  Commonly known as: GLUCOPHAGE  Take 500 mg by mouth 2 (two) times daily.     pantoprazole 40 MG tablet  Commonly known as: PROTONIX  Take 40 mg by mouth once daily.     pregabalin 75 MG capsule  Commonly known as: LYRICA  Take 1 capsule (75 mg total) by mouth 2 (two) times daily.     SITagliptan-metformin 50-1,000 mg per tablet  Commonly known as: JANUMET  Take 1 tablet by mouth 2 (two) times daily with meals.              Indwelling Lines/Drains at time of discharge:   Lines/Drains/Airways       Drain  Duration                  Urethral Catheter 03/03/24 0104 Straight-tip 16 Fr. 1 day                    Time spent on the discharge of patient: 35 minutes         Chacho Garcia MD  Department of Hospital Medicine  [facility] Surg

## 2024-03-04 NOTE — ASSESSMENT & PLAN NOTE
Malnutrition Type:  Context: chronic illness  Level: severe    Related to (etiology):   Inadequate energy/protein intake    Signs and Symptoms (as evidenced by):   Reports patient consuming <25% at meals, weight loss     Malnutrition Characteristic Summary:  Energy Intake (Malnutrition): less than 75% for greater than or equal to 3 months  Subcutaneous Fat (Malnutrition): severe depletion  Muscle Mass (Malnutrition): severe depletion      Interventions/Recommendations (treatment strategy):  1. ADAT to 1500 Calorie diabetic diet. Encourage high-calorie, high protein food sources. Small, frequent meals as tolerated; Texture per SLP. 2. Recommend ONS Boost GC/Glucerna with all meals/between meals. 3. Continue appetite stimulant. 4. Consider MVI + B-complex vitamin. 5. Encourage adding pre/probiotic foods, while patient receiving antibiotics to avoid adverse effects to gut microbiome. 6. RD to monitor.    Nutrition Diagnosis Status:   New

## 2024-03-04 NOTE — PLAN OF CARE
Problem: Infection  Goal: Absence of Infection Signs and Symptoms  Outcome: Ongoing, Progressing     Problem: Adult Inpatient Plan of Care  Goal: Plan of Care Review  Outcome: Ongoing, Progressing  Goal: Patient-Specific Goal (Individualized)  Outcome: Ongoing, Progressing  Goal: Absence of Hospital-Acquired Illness or Injury  Outcome: Ongoing, Progressing  Goal: Optimal Comfort and Wellbeing  Outcome: Ongoing, Progressing  Goal: Readiness for Transition of Care  Outcome: Ongoing, Progressing     Problem: Diabetes Comorbidity  Goal: Blood Glucose Level Within Targeted Range  Outcome: Ongoing, Progressing     Problem: Fluid Imbalance (Pneumonia)  Goal: Fluid Balance  Outcome: Ongoing, Progressing     Problem: Infection (Pneumonia)  Goal: Resolution of Infection Signs and Symptoms  Outcome: Ongoing, Progressing     Problem: Respiratory Compromise (Pneumonia)  Goal: Effective Oxygenation and Ventilation  Outcome: Ongoing, Progressing     Problem: Skin Injury Risk Increased  Goal: Skin Health and Integrity  Outcome: Ongoing, Progressing     Problem: Fall Injury Risk  Goal: Absence of Fall and Fall-Related Injury  Outcome: Ongoing, Progressing

## 2024-03-04 NOTE — PLAN OF CARE
APPOINTMENT:    Patient Appointment(s) scheduled with Alexandre Farris MD  Monday Mar 18, 2024 9:00 AM

## 2024-03-04 NOTE — NURSING
Patient and daughter received discharge instructions and verbalized understanding. IV discontinued with catheter tip intact. Medications delivered to bedside. Patient disconnected from telemetry monitor. Patient and daughter in room waiting for wheelchair transport.

## 2024-03-04 NOTE — PLAN OF CARE
Recommendations    1. ADAT to 1500 Calorie diabetic diet. Encourage high-calorie, high protein food sources. Small, frequent meals as tolerated; Texture per SLP.   2. Recommend ONS Boost GC/Glucerna with all meals/between meals.   3. Continue appetite stimulant.   4. Consider MVI + B-complex vitamin.   5. Encourage adding pre/probiotic foods, while patient receiving antibiotics to avoid adverse effects to gut microbiome.   6. RD to monitor.    Goals: Meet % EEN/EPN by follow-up date.  Nutrition Goal Status: new  Communication of RD Recs: other (comment) (POC)

## 2024-03-04 NOTE — HOSPITAL COURSE
Patient seen and examined at bedside. Used medical interpretter #[id number] for Azeri interpretation. Patient saturating well on RA. Reports cough with occasional sputum production. CTX and azithro ordered for CAP coverage.Switched to cefpodoxime on d/c. Reports on walking stomach feels tights and her legs feel very tired. CTA  A/P showing Advanced aortoiliac atherosclerosis.  Severe stenosis versus intermittent occlusion of the superior mesenteric artery.  Significant stenosis of the iliofemoral arteries with probable occlusion of the right superficial femoral artery and severe stenosis of the left superficial femoral artery.  Evaluation of small and medium arteries significantly limited by motion. Vascular surgery consulted. Given no rest pain, no evidence of lower extremity wounds, No further inpatient workup required from Vascular perspective, Outpatient follow up with ADENIKE/PVRs and mesenteric duplex with Dr. Greenfield. Started aspirin 81mg daily and increase lipitor to 80mg daily. BP noted to be in 160's and 180's. Increased losartan to 100mg daily. Reports home BP also ranges in 160's. Advised to log BP at home and fu with PCP.  Patient is currently medically and HDS. She is being d/c home. FU with PCP and vascular surgery as OP. ER precautions given. Plan of care discussed with patient and family at bedside, verbalized understanding. All questions were answered.

## 2024-03-04 NOTE — PLAN OF CARE
[facility] - Med Surg  Initial Discharge Assessment       Primary Care Provider: No, Primary Doctor    Admission Diagnosis: Chest pain [R07.9]  Abdominal pain, unspecified abdominal location [R10.9]  Pneumonia of right lung due to infectious organism, unspecified part of lung [J18.9]    Admission Date: 3/2/2024  Expected Discharge Date: 3/4/2024         Payor: MEDICAID / Plan: [place]UE [place] / Product Type: Managed Medicaid /     Address: [address] [place] [id number]    Discharge Plan A: Home with family, Home  Discharge Plan B: Home with family, Home      [id number] - [place], [place] - [address]  [address]  [place] [address]  Phone: [phone] Fax: [phone]      Initial Assessment (most recent)       Adult Discharge Assessment - 03/04/24 1127          Discharge Assessment    Assessment Type Discharge Planning Assessment     Confirmed/corrected address, phone number and insurance Yes     Confirmed Demographics --   Updated in pt chart    Source of Information family   Pt daughter (Dory) daughter    Does patient/caregiver understand observation status Yes     Communicated LESLY with patient/caregiver Yes   Pt LESLY scheduled for 3/4/2024    Reason For Admission Peripheral arterial disease     People in Home child(christian), adult     Do you expect to return to your current living situation? Yes     Do you have help at home or someone to help you manage your care at home? Yes     Who are your caregiver(s) and their phone number(s)? (Daughter) Dory [phone]     Current cognitive status: --   Unable to assess; pt alseep    Home Layout Able to live on 1st floor     Equipment Currently Used at Home walker, rolling;cane, straight     Patient currently being followed by outpatient case management? No     Do you currently have service(s) that help you manage your care at home? No     Do you take prescription medications? No   Per pt daughter pt non-compliant with  medication    Do you have prescription coverage? Yes     Coverage MEDICAID - [place]     Do you have any problems affording any of your prescribed medications? No     Is the patient taking medications as prescribed? --   Per pt daughter pt non-compliant    Are you on dialysis? No     Do you take coumadin? No     Discharge Plan A Home with family;Home     Discharge Plan B Home with family;Home     DME Needed Upon Discharge  none     Discharge Plan discussed with: Spouse/sig other     Name(s) and Number(s) Daughter Charlee) [phone]        Physical Activity    On average, how many days per week do you engage in moderate to strenuous exercise (like a brisk walk)? --   Pt daughter provided info: pt takes about 10steps       Transportation Needs    In the past 12 months, has lack of transportation kept you from medical appointments or from getting medications? No   Pt daughter provided info    In the past 12 months, has lack of transportation kept you from meetings, work, or from getting things needed for daily living? No        Social Connections    In a typical week, how many times do you talk on the phone with family, friends, or neighbors? More than three times a week     How often do you get together with friends or relatives? Once a week        Alcohol Use    Q1: How often do you have a drink containing alcohol? --   Pt daughter provided info; pt don't drink alcohol                  SW met with patient (pt asleep) and pt daughter in room to complete DPA. Questions answered / contact numbers provided.  Use PREFERRED PHARMACY / BEDSIDE DELIVERY for any necessary medications at time of discharge. Pt pt daughter pt is independent but can be dependent with her assistant and pt does have a rolling walker and can to help assist. Pt daughter will be assisting with help upon discharge. Pt daughter also will be providing pt transportation home. Hospital follow up will be scheduled with PCP. Will  continue to follow for course of hospitalization. [facility] to inquire about [facility] for pt.      Discharge Plan A and Plan B have been determined by review of patient's clinical status, future medical and therapeutic needs, and coverage/benefits for post-acute care in coordination with multidisciplinary team members.     ANANYA Hair   Case Management Dept-[facility]  [phone]

## 2024-03-04 NOTE — NURSING
Nurses Note -- 4 Eyes      3/4/2024   5:25 AM      Skin assessed during: Admit      [x] No Altered Skin Integrity Present    []Prevention Measures Documented      [] Yes- Altered Skin Integrity Present or Discovered   [] LDA Added if Not in Epic (Describe Wound)   [] New Altered Skin Integrity was Present on Admit and Documented in LDA   [] Wound Image Taken    Wound Care Consulted? No    Attending Nurse:  Neel Romero RN/Staff Member:  Akbar

## 2024-03-04 NOTE — CONSULTS
[facility] - Med Surg  Adult Nutrition  Consult Note    SUMMARY     Recommendations    1. ADAT to 1500 Calorie diabetic diet. Encourage high-calorie, high protein food sources. Small, frequent meals as tolerated; Texture per SLP.   2. Recommend ONS Boost GC/Glucerna with all meals/between meals.   3. Continue appetite stimulant.   4. Consider MVI + B-complex vitamin.   5. Encourage adding pre/probiotic foods, while patient receiving antibiotics to avoid adverse effects to gut microbiome.   6. RD to monitor.    Goals: Meet % EEN/EPN by follow-up date.  Nutrition Goal Status: new  Communication of RD Recs: other (comment) (POC)    Assessment and Plan    Endocrine  Severe protein-calorie malnutrition  Malnutrition Type:  Context: chronic illness  Level: severe    Related to (etiology):   Inadequate energy/protein intake    Signs and Symptoms (as evidenced by):   Reports patient consuming <25% at meals, weight loss     Malnutrition Characteristic Summary:  Energy Intake (Malnutrition): less than 75% for greater than or equal to 3 months  Subcutaneous Fat (Malnutrition): severe depletion  Muscle Mass (Malnutrition): severe depletion      Interventions/Recommendations (treatment strategy):  1. ADAT to 1500 Calorie diabetic diet. Encourage high-calorie, high protein food sources. Small, frequent meals as tolerated; Texture per SLP. 2. Recommend ONS Boost GC/Glucerna with all meals/between meals. 3. Continue appetite stimulant. 4. Consider MVI + B-complex vitamin. 5. Encourage adding pre/probiotic foods, while patient receiving antibiotics to avoid adverse effects to gut microbiome. 6. RD to monitor.    Nutrition Diagnosis Status:   New         Malnutrition Assessment  Malnutrition Context: chronic illness  Malnutrition Level: severe  Skin (Micronutrient): bruised, dry, thinned, turgor reduced, scaly  Nails (Micronutrient): ridged, thin  Hair/Scalp (Micronutrient): dry  Eyes (Micronutrient): other (see comments),  conjunctiva dry (corneal arcus)  Extraoral (Micronutrient): none  Gums (Micronutrient): none  Lips/Mucous Membranes (Micronutrient): other (see comments) (dry)  Teeth (Micronutrient): edentulous, broken dentition  Tongue (Micronutrient): none  Neck/Chest (Micronutrient): bony prominence, muscle wasting, neck veins distended, subcutaneous fat loss  Musculoskeletal/Lower Extremities: muscle wasting, subcutaneous fat loss   Micronutrient Evaluation Summary: suspected deficiency  Micronutrient Evaluation Comments: B-vitamins (B1, B5, B6, B7, B9, B12), calcium, iron, phosphorus, zinc, selenium   Energy Intake (Malnutrition): less than 75% for greater than or equal to 3 months  Subcutaneous Fat (Malnutrition): severe depletion  Muscle Mass (Malnutrition): severe depletion   Orbital Region (Subcutaneous Fat Loss): severe depletion  Upper Arm Region (Subcutaneous Fat Loss): severe depletion  Thoracic and Lumbar Region: severe depletion   Mandaen Region (Muscle Loss): severe depletion  Clavicle Bone Region (Muscle Loss): severe depletion  Clavicle and Acromion Bone Region (Muscle Loss): severe depletion  Scapular Bone Region (Muscle Loss): severe depletion  Dorsal Hand (Muscle Loss): severe depletion  Patellar Region (Muscle Loss): severe depletion  Anterior Thigh Region (Muscle Loss): severe depletion  Posterior Calf Region (Muscle Loss): severe depletion                 Reason for Assessment    Reason For Assessment: consult  Diagnosis:  (PAD (peripheral arterial disease))  Relevant Medical History: DM2, HTN  Interdisciplinary Rounds: did not attend  General Information Comments: RD consulted for malnutrition. Per chart family reports decreased PO intake x 2 years. Current prescribed appetite stimlant. RD spokw with daughter at bedside, reports patient refusing most protein, but will sometime eat fish. Favorite meals tortilla, beans, rice and salsa occasionally will eat eggs but not very often <25% noted at meals- consumes  "2 meals/day. UBW ~80lbs x >3 years. Wt hx not very detailed per chart. NFPE performed 3/5. Pt meets criteria for malnutrition- please see PES for details.  Nutrition Discharge Planning: Pending Clinical Course    Nutrition Risk Screen    Nutrition Risk Screen: no indicators present    Nutrition/Diet History    Patient Reported Diet/Restrictions/Preferences: general  Food Preferences: No meat, fish OK occasionally  Spiritual, Cultural Beliefs, Islam Practices, Values that Affect Care: no  Supplemental Drinks or Food Habits: Ensure Original (1x/ day)  Vitamin/Mineral/Herbal Supplements: Vit B, iron, calcium  Food Allergies: NKFA  Factors Affecting Nutritional Intake: decreased appetite    Anthropometrics    Temp: 97.6 °F (36.4 °C)  Height: 4' 11" (149.9 cm)  Height (inches): 59 in  Weight Method: Standard Scale  Weight: 29.9 kg (66 lb)  Weight (lb): 66 lb  Ideal Body Weight (IBW), Female: 95 lb  % Ideal Body Weight, Female (lb): 69.47 %  BMI (Calculated): 13.3       Lab/Procedures/Meds    Pertinent Labs Reviewed: reviewed  Pertinent Labs Comments: MCHC 31.5, MCH 26.8, A1C 8.5  Pertinent Medications Reviewed: reviewed  Pertinent Medications Comments: tylenol, atorvastatin, azithromycin, rocephin, heparrin, megestrol, methocarbamol, pantoprazole      Estimated/Assessed Needs    Weight Used For Calorie Calculations: 29.9 kg (66 lb)  Energy Calorie Requirements (kcal): 9800-8465 kcal/d (35-40 kcal/kg)  Energy Need Method: Kcal/kg  Protein Requirements: 36-45 g/d (1.2-1.5 g/kg)  Weight Used For Protein Calculations: 29.9 kg (66 lb)        RDA Method (mL): 1048  CHO Requirement: 131-150 g      Nutrition Prescription Ordered    Current Diet Order: NPO    Evaluation of Received Nutrient/Fluid Intake    I/O: -2.80 L  Comments: LBM: 3/2  % Intake of Estimated Energy Needs: 75 - 100 %  % Meal Intake: NPO    Nutrition Risk    Level of Risk/Frequency of Follow-up: high (2x/ week)       Monitor and Evaluation    Food and " Nutrient Intake: food and beverage intake, energy intake  Food and Nutrient Adminstration: diet order  Physical Activity and Function: nutrition-related ADLs and IADLs, factors affecting access to physical activity  Anthropometric Measurements: weight, weight change, body mass index  Biochemical Data, Medical Tests and Procedures: lipid profile, inflammatory profile, glucose/endocrine profile, gastrointestinal profile, electrolyte and renal panel  Nutrition-Focused Physical Findings: skin, head and eyes, extremities, muscles and bones, overall appearance       Nutrition Follow-Up    RD Follow-up?: Yes    Gabriel Dan Registration Eligible, Provisional LDN

## 2024-03-05 NOTE — PLAN OF CARE
[facility] - Med Surg  Discharge Final Note    Primary Care Provider: No, Primary Doctor    Expected Discharge Date: 3/4/2024    Future Appointments   Date Time Provider Department Center   3/18/2024  9:00 AM Alexandre Farris MD [facility]         Final Discharge Note (most recent)       Final Note - 03/05/24 0834          Final Note    Assessment Type Final Discharge Note     Anticipated Discharge Disposition Home or Self Care   Pt discharge home with her daughter Dory    What phone number can be called within the next 1-3 days to see how you are doing after discharge? [phone]   Pt daughter number provided    Hospital Resources/Appts/Education Provided Provided patient/caregiver with written discharge plan information        Post-Acute Status    Coverage MEDICAID - [place]UE [place]     Discharge Delays None known at this time                          Contact Info       No, Primary Doctor   Relationship: PCP - General        Next Steps: Follow up          Pt was discharged back home with her daughter with no needs. Pt daughter was given [facility] website and number to seek if needed information about their wavier program.     ANANYA Hair   Case Management Dept-[facility]  [phone]

## 2024-03-07 LAB
BACTERIA BLD CULT: NORMAL
BACTERIA BLD CULT: NORMAL

## 2024-03-18 ENCOUNTER — TELEPHONE (OUTPATIENT)
Dept: VASCULAR SURGERY | Facility: CLINIC | Age: 84
End: 2024-03-18
Payer: MEDICAID

## 2024-03-18 ENCOUNTER — OFFICE VISIT (OUTPATIENT)
Dept: INTERNAL MEDICINE | Facility: CLINIC | Age: 84
End: 2024-03-18
Payer: MEDICAID

## 2024-03-18 VITALS
SYSTOLIC BLOOD PRESSURE: 130 MMHG | HEIGHT: 59 IN | HEART RATE: 107 BPM | DIASTOLIC BLOOD PRESSURE: 80 MMHG | BODY MASS INDEX: 13.31 KG/M2 | OXYGEN SATURATION: 97 % | WEIGHT: 66 LBS

## 2024-03-18 DIAGNOSIS — R63.4 LOSS OF WEIGHT: ICD-10-CM

## 2024-03-18 DIAGNOSIS — K55.9 MESENTERIC ISCHEMIA: ICD-10-CM

## 2024-03-18 DIAGNOSIS — K55.1 SUPERIOR MESENTERIC ARTERY STENOSIS: ICD-10-CM

## 2024-03-18 DIAGNOSIS — R10.9 ABDOMINAL PAIN, UNSPECIFIED ABDOMINAL LOCATION: Primary | ICD-10-CM

## 2024-03-18 DIAGNOSIS — I73.9 PERIPHERAL ARTERIAL DISEASE: Primary | ICD-10-CM

## 2024-03-18 PROCEDURE — 3075F SYST BP GE 130 - 139MM HG: CPT | Mod: CPTII,,, | Performed by: INTERNAL MEDICINE

## 2024-03-18 PROCEDURE — 1159F MED LIST DOCD IN RCRD: CPT | Mod: CPTII,,, | Performed by: INTERNAL MEDICINE

## 2024-03-18 PROCEDURE — 99214 OFFICE O/P EST MOD 30 MIN: CPT | Mod: PBBFAC | Performed by: INTERNAL MEDICINE

## 2024-03-18 PROCEDURE — 1160F RVW MEDS BY RX/DR IN RCRD: CPT | Mod: CPTII,,, | Performed by: INTERNAL MEDICINE

## 2024-03-18 PROCEDURE — 3288F FALL RISK ASSESSMENT DOCD: CPT | Mod: CPTII,,, | Performed by: INTERNAL MEDICINE

## 2024-03-18 PROCEDURE — 99204 OFFICE O/P NEW MOD 45 MIN: CPT | Mod: S$PBB,,, | Performed by: INTERNAL MEDICINE

## 2024-03-18 PROCEDURE — 1111F DSCHRG MED/CURRENT MED MERGE: CPT | Mod: CPTII,,, | Performed by: INTERNAL MEDICINE

## 2024-03-18 PROCEDURE — 1101F PT FALLS ASSESS-DOCD LE1/YR: CPT | Mod: CPTII,,, | Performed by: INTERNAL MEDICINE

## 2024-03-18 PROCEDURE — 99999 PR PBB SHADOW E&M-EST. PATIENT-LVL IV: CPT | Mod: PBBFAC,,, | Performed by: INTERNAL MEDICINE

## 2024-03-18 PROCEDURE — 3079F DIAST BP 80-89 MM HG: CPT | Mod: CPTII,,, | Performed by: INTERNAL MEDICINE

## 2024-03-18 RX ORDER — PENICILLIN V POTASSIUM 500 MG/1
500 TABLET, FILM COATED ORAL 2 TIMES DAILY
COMMUNITY
Start: 2024-03-12 | End: 2024-04-16

## 2024-03-18 NOTE — PROGRESS NOTES
Subjective:       Patient ID: Jing Velasquez is a 83 y.o. female.    Chief Complaint: Follow-up    Patient is here for followup for chronic conditions/est care.    Due to language barrier, an  was present via video during the entire office visit with this patient and her dtr.    Had a recent hosp stay, found to have some arterial blockages.    She has constant stomach discomfort -- day and noc.    Patient is tired in general, gets tired after walking 10 ft.    Diff swallowing.    Pcp -- [facility] in [place], Gerald Son DO    Patient dtr also asked abt FMLA forms, has asked Gerald Son DO to complete.      Review of Systems   Constitutional:  Positive for activity change and fatigue.   Respiratory:  Positive for shortness of breath.    Cardiovascular:  Negative for chest pain.   Gastrointestinal:  Positive for abdominal pain. Negative for abdominal distention, nausea and vomiting.   Skin:  Negative for wound.   Psychiatric/Behavioral:  Negative for confusion.            Objective:      Physical Exam  Vitals reviewed.   Constitutional:       General: She is not in acute distress.     Appearance: Normal appearance. She is well-developed. She is not ill-appearing, toxic-appearing or diaphoretic.      Comments: Appears underwt   HENT:      Head: Normocephalic and atraumatic.   Eyes:      General: No scleral icterus.     Pupils: Pupils are equal, round, and reactive to light.   Neck:      Thyroid: No thyromegaly.   Cardiovascular:      Rate and Rhythm: Normal rate and regular rhythm.      Heart sounds: Normal heart sounds. No murmur heard.     No friction rub. No gallop.   Pulmonary:      Effort: Pulmonary effort is normal. No respiratory distress.      Breath sounds: Normal breath sounds. No wheezing or rales.   Abdominal:      General: Bowel sounds are normal. There is no distension.      Palpations: Abdomen is soft. There is no mass.      Tenderness: There is abdominal  tenderness. There is no guarding or rebound.      Comments: Mild nonfocal tenderness to deep palpation     Musculoskeletal:         General: No tenderness. Normal range of motion.      Cervical back: Normal range of motion.      Right lower leg: No edema.      Left lower leg: No edema.   Lymphadenopathy:      Cervical: No cervical adenopathy.   Neurological:      General: No focal deficit present.      Mental Status: She is alert and oriented to person, place, and time.   Psychiatric:         Mood and Affect: Mood normal.         Speech: Speech normal.         Behavior: Behavior normal.         Assessment:       1. Abdominal pain, unspecified abdominal location    2. Mesenteric ischemia        Plan:       Jing was seen today for follow-up.    Diagnoses and all orders for this visit:    Abdominal pain, unspecified abdominal location  -     Ambulatory referral/consult to Vascular Surgery; Future  Mesenteric ischemia  -     Ambulatory referral/consult to Vascular Surgery; Future      Wt loss -- see vascular surgeon due to mes ischemia.    F/u with Gerald Son, DO      Future Appointments   Date Time Provider Department Center   4/16/2024  8:30 AM VASCULAR, LAB [facility]   4/16/2024  9:00 AM [facility]   4/16/2024  9:45 AM MADDI Greenfield III, MD [facility]

## 2024-03-18 NOTE — TELEPHONE ENCOUNTER
Pt's daughter Dory returning nurse's call. Appointment scheduled, Dory verified. Instructed daughter to have patient fast 8 hours prior to ultrasound and to hold metformin while fasting. Dory repeated instructions and verbalized understanding. Appointment letter placed in mail.   ----- Message from Ronnie Purvis MD sent at 3/3/2024  6:00 PM CST -----  Hello this patient was seen in the ED for lower extremity life-limiting claudication and stomach pain with a history of SMA stent, if we could schedule a 1-2 month follow-up with ADENIKE PVRs, and mesenteric duplex please thank you!

## 2024-03-18 NOTE — TELEPHONE ENCOUNTER
Attempted to contact pt and daughter Dory to schedule appt. Voice message left for pt and daughter requesting return call.----- Message from Ronnie Purvis MD sent at 3/3/2024  6:00 PM CST -----  Hello this patient was seen in the ED for lower extremity life-limiting claudication and stomach pain with a history of SMA stent, if we could schedule a 1-2 month follow-up with ADENIKE PVRs, and mesenteric duplex please thank you!

## 2024-03-19 ENCOUNTER — TELEPHONE (OUTPATIENT)
Dept: INTERNAL MEDICINE | Facility: CLINIC | Age: 84
End: 2024-03-19
Payer: MEDICAID

## 2024-03-20 ENCOUNTER — TELEPHONE (OUTPATIENT)
Dept: INTERNAL MEDICINE | Facility: CLINIC | Age: 84
End: 2024-03-20
Payer: MEDICAID

## 2024-03-20 NOTE — TELEPHONE ENCOUNTER
----- Message from Arin Ring sent at 3/20/2024  1:44 PM CDT -----  Contact: [phone] -Dory  [email or web address]     Patient is calling for Medical Advice regarding:Ms is calling to see if [employer] paperwork done and fax. Please call and advise     How long has patient had these symptoms:    Pharmacy name and phone#:    Would like response via Partenderhart:  call   Comments:

## 2024-04-16 ENCOUNTER — HOSPITAL ENCOUNTER (OUTPATIENT)
Dept: VASCULAR SURGERY | Facility: CLINIC | Age: 84
Discharge: HOME | End: 2024-04-16
Attending: SURGERY
Payer: MEDICAID

## 2024-04-16 ENCOUNTER — INITIAL CONSULT (OUTPATIENT)
Dept: VASCULAR SURGERY | Facility: CLINIC | Age: 84
End: 2024-04-16
Attending: SURGERY
Payer: MEDICAID

## 2024-04-16 ENCOUNTER — TELEPHONE (OUTPATIENT)
Dept: VASCULAR SURGERY | Facility: CLINIC | Age: 84
End: 2024-04-16
Payer: MEDICAID

## 2024-04-16 VITALS
HEART RATE: 87 BPM | SYSTOLIC BLOOD PRESSURE: 180 MMHG | DIASTOLIC BLOOD PRESSURE: 76 MMHG | TEMPERATURE: 98 F | HEIGHT: 57 IN | BODY MASS INDEX: 13.32 KG/M2 | WEIGHT: 61.75 LBS

## 2024-04-16 DIAGNOSIS — I73.9 PERIPHERAL ARTERIAL DISEASE: ICD-10-CM

## 2024-04-16 DIAGNOSIS — K55.1 CHRONIC MESENTERIC ISCHEMIA: Primary | ICD-10-CM

## 2024-04-16 DIAGNOSIS — K55.1 SUPERIOR MESENTERIC ARTERY STENOSIS: ICD-10-CM

## 2024-04-16 DIAGNOSIS — E43 SEVERE PROTEIN-CALORIE MALNUTRITION: ICD-10-CM

## 2024-04-16 DIAGNOSIS — K55.1 SUPERIOR MESENTERIC ARTERY STENOSIS: Primary | ICD-10-CM

## 2024-04-16 PROCEDURE — 93975 VASCULAR STUDY: CPT | Mod: PBBFAC | Performed by: SURGERY

## 2024-04-16 PROCEDURE — 99999 PR PBB SHADOW E&M-EST. PATIENT-LVL III: CPT | Mod: PBBFAC,,, | Performed by: SURGERY

## 2024-04-16 PROCEDURE — 93975 VASCULAR STUDY: CPT | Mod: 26,S$PBB,, | Performed by: SURGERY

## 2024-04-16 PROCEDURE — 99215 OFFICE O/P EST HI 40 MIN: CPT | Mod: S$PBB,,, | Performed by: SURGERY

## 2024-04-16 PROCEDURE — 99213 OFFICE O/P EST LOW 20 MIN: CPT | Mod: PBBFAC,25 | Performed by: SURGERY

## 2024-04-16 PROCEDURE — 93923 UPR/LXTR ART STDY 3+ LVLS: CPT | Mod: 26,S$PBB,, | Performed by: SURGERY

## 2024-04-16 PROCEDURE — 93923 UPR/LXTR ART STDY 3+ LVLS: CPT | Mod: PBBFAC | Performed by: SURGERY

## 2024-04-16 RX ORDER — EMPAGLIFLOZIN 25 MG/1
25 TABLET, FILM COATED ORAL DAILY
COMMUNITY
Start: 2024-03-18 | End: 2024-05-20

## 2024-04-16 NOTE — PROGRESS NOTES
VASCULAR SURGERY SERVICE    CHIEF COMPLAINT:  Chronic mesenteric ischemia     used for this visit    HISTORY OF PRESENT ILLNESS: Jing Velasquez is a 84 y.o. female [place].   The daughter reports that her postprandial pain resolved after this but has been now Current for at least a year.  She is lost 15 lb over the last year in currently has a BMI of 13, weighs 61 lb (28 kilos).    Through the  states this seems as though she has abdominal pain without eating as well.  Also has some wearing this of her legs and known PAD    The patient is taking aspirin    Past Medical History:   Diagnosis Date    Diabetes mellitus     Hypertension        No past surgical history on file.      Current Outpatient Medications:     aspirin (ECOTRIN) 81 MG EC tablet, Take 1 tablet (81 mg total) by mouth once daily., Disp: 30 tablet, Rfl: 0    atorvastatin (LIPITOR) 80 MG tablet, Take 1 tablet (80 mg total) by mouth once daily., Disp: 90 tablet, Rfl: 3    carBAMazepine (TEGRETOL) 200 mg tablet, Take 200 mg by mouth once daily., Disp: , Rfl:     JARDIANCE 25 mg tablet, Take 25 mg by mouth., Disp: , Rfl:     losartan (COZAAR) 100 MG tablet, Take 1 tablet (100 mg total) by mouth once daily., Disp: 90 tablet, Rfl: 3    megestroL (MEGACE) 400 mg/10 mL (40 mg/mL) Susp, Take 400 mg by mouth., Disp: , Rfl:     metFORMIN (GLUCOPHAGE) 500 MG tablet, Take 500 mg by mouth 2 (two) times daily., Disp: , Rfl:     pantoprazole (PROTONIX) 40 MG tablet, Take 40 mg by mouth once daily., Disp: , Rfl:     Review of patient's allergies indicates:  No Known Allergies    No family history on file.    Social History     Tobacco Use    Smoking status: Never    Smokeless tobacco: Never   Substance Use Topics    Alcohol use: No       REVIEW OF SYSTEMS:  General: No chills, fever, malaise, changes in weight  HEENT: No visual changes, difficulty hearing  Cardiovascular: No chest pain,  "palpitations, claudication  Pulmonary: No dyspnea, cough, wheezing  Gastrointestinal: No nausea, vomiting, diarrhea, constipation  Genitourinary: No dysuria, low urine output, hematuria  Endocrine: No polydipsia, polyphagia  Hematologic: No fatigue with exertion, pica, pallor  Musculoskeletal: No extremity or joint pain, no back pain, no difficulty with ambulation  Neurologic: no seizures, no headaches, no weakness  Psychiatric: no mood disturbance      PHYSICAL EXAM:  BMI 13.3, weight 28 kilos (61 lb)  BP (!) 180/76 (BP Location: Left arm, Patient Position: Sitting, BP Method: Medium (Automatic))   Pulse 87   Temp 97.5 °F (36.4 °C) (Oral)   Ht 4' 9" (1.448 m)   Wt 28 kg (61 lb 11.7 oz)   BMI 13.36 kg/m²   Constitutional:  Very frail and cachectic   Neurological: Normal speech  no focal findings  CN II - XII grossly intact.    Psychiatric: Mood and affect appropriate and symmetric.   HEENT: Normocephalic / atraumatic  PERRLA  Midline trachea  No scars across the neck   Cardiac: Regular rate and rhythm.   Pulmonary: Normal pulmonary effort.   Abdomen: Soft, not distended.  Nontender   Skin: Warm and well perfused.    Vascular:  2+ left radial and brachial pulses   Extremities/  Musculoskeletal: No edema.   Arms are very tiny     IMAGING:  SMA duplex today shows peak systolic velocity of 349 in the mid SFA.  Low monophasic flow is noted distal to the stent    CTA from March 2024 was personally reviewed.  There is significant motion artifact.  Proximal SMA stent can be appreciated, the mid SMA is essentially nondiagnostic because of motion artifact      IMPRESSION:  Recurrent chronic mesenteric ischemia.  Likely due to severe SMA stenosis  Severe malnutrition, weight 61 lb    Given her severe malnutrition would like to avoid a general anesthetic.  Given the angulation of her SMA and small aorto coming from above is going to be technically much simpler then a femoral approach.   I would like to keep the accessed " very low profile in may elect to do this solely with a 4 Armenian system and coronary equipment    PLAN:  Percutaneous left brachial versus left radial approach, SMA arteriogram and probable intervention 04/22/2024  Hybrid OR 11 case  Mac anesthesia    I have explained the risks, benefits and alternatives for this procedure in detail.  The patient voices understanding and all questions have be answered, and agrees to proceed with the procedure.     ROMI Greenfield III, MD, FACS  Professor and Chief, Vascular and Endovascular Surgery

## 2024-04-16 NOTE — H&P (VIEW-ONLY)
VASCULAR SURGERY SERVICE    CHIEF COMPLAINT:  Chronic mesenteric ischemia     used for this visit    HISTORY OF PRESENT ILLNESS: Jing Velasquez is a 84 y.o. female [place].   The daughter reports that her postprandial pain resolved after this but has been now Current for at least a year.  She is lost 15 lb over the last year in currently has a BMI of 13, weighs 61 lb (28 kilos).    Through the  states this seems as though she has abdominal pain without eating as well.  Also has some wearing this of her legs and known PAD    The patient is taking aspirin    Past Medical History:   Diagnosis Date    Diabetes mellitus     Hypertension        No past surgical history on file.      Current Outpatient Medications:     aspirin (ECOTRIN) 81 MG EC tablet, Take 1 tablet (81 mg total) by mouth once daily., Disp: 30 tablet, Rfl: 0    atorvastatin (LIPITOR) 80 MG tablet, Take 1 tablet (80 mg total) by mouth once daily., Disp: 90 tablet, Rfl: 3    carBAMazepine (TEGRETOL) 200 mg tablet, Take 200 mg by mouth once daily., Disp: , Rfl:     JARDIANCE 25 mg tablet, Take 25 mg by mouth., Disp: , Rfl:     losartan (COZAAR) 100 MG tablet, Take 1 tablet (100 mg total) by mouth once daily., Disp: 90 tablet, Rfl: 3    megestroL (MEGACE) 400 mg/10 mL (40 mg/mL) Susp, Take 400 mg by mouth., Disp: , Rfl:     metFORMIN (GLUCOPHAGE) 500 MG tablet, Take 500 mg by mouth 2 (two) times daily., Disp: , Rfl:     pantoprazole (PROTONIX) 40 MG tablet, Take 40 mg by mouth once daily., Disp: , Rfl:     Review of patient's allergies indicates:  No Known Allergies    No family history on file.    Social History     Tobacco Use    Smoking status: Never    Smokeless tobacco: Never   Substance Use Topics    Alcohol use: No       REVIEW OF SYSTEMS:  General: No chills, fever, malaise, changes in weight  HEENT: No visual changes, difficulty hearing  Cardiovascular: No chest pain,  "palpitations, claudication  Pulmonary: No dyspnea, cough, wheezing  Gastrointestinal: No nausea, vomiting, diarrhea, constipation  Genitourinary: No dysuria, low urine output, hematuria  Endocrine: No polydipsia, polyphagia  Hematologic: No fatigue with exertion, pica, pallor  Musculoskeletal: No extremity or joint pain, no back pain, no difficulty with ambulation  Neurologic: no seizures, no headaches, no weakness  Psychiatric: no mood disturbance      PHYSICAL EXAM:  BMI 13.3, weight 28 kilos (61 lb)  BP (!) 180/76 (BP Location: Left arm, Patient Position: Sitting, BP Method: Medium (Automatic))   Pulse 87   Temp 97.5 °F (36.4 °C) (Oral)   Ht 4' 9" (1.448 m)   Wt 28 kg (61 lb 11.7 oz)   BMI 13.36 kg/m²   Constitutional:  Very frail and cachectic   Neurological: Normal speech  no focal findings  CN II - XII grossly intact.    Psychiatric: Mood and affect appropriate and symmetric.   HEENT: Normocephalic / atraumatic  PERRLA  Midline trachea  No scars across the neck   Cardiac: Regular rate and rhythm.   Pulmonary: Normal pulmonary effort.   Abdomen: Soft, not distended.  Nontender   Skin: Warm and well perfused.    Vascular:  2+ left radial and brachial pulses   Extremities/  Musculoskeletal: No edema.   Arms are very tiny     IMAGING:  SMA duplex today shows peak systolic velocity of 349 in the mid SFA.  Low monophasic flow is noted distal to the stent    CTA from March 2024 was personally reviewed.  There is significant motion artifact.  Proximal SMA stent can be appreciated, the mid SMA is essentially nondiagnostic because of motion artifact      IMPRESSION:  Recurrent chronic mesenteric ischemia.  Likely due to severe SMA stenosis  Severe malnutrition, weight 61 lb    Given her severe malnutrition would like to avoid a general anesthetic.  Given the angulation of her SMA and small aorto coming from above is going to be technically much simpler then a femoral approach.   I would like to keep the accessed " very low profile in may elect to do this solely with a 4 Icelandic system and coronary equipment    PLAN:  Percutaneous left brachial versus left radial approach, SMA arteriogram and probable intervention 04/22/2024  Hybrid OR 11 case  Mac anesthesia    I have explained the risks, benefits and alternatives for this procedure in detail.  The patient voices understanding and all questions have be answered, and agrees to proceed with the procedure.     ROMI Greenfield III, MD, FACS  Professor and Chief, Vascular and Endovascular Surgery

## 2024-04-19 ENCOUNTER — TELEPHONE (OUTPATIENT)
Dept: VASCULAR SURGERY | Facility: CLINIC | Age: 84
End: 2024-04-19
Payer: MEDICAID

## 2024-04-19 NOTE — TELEPHONE ENCOUNTER
Contacted pt's daughter Dory with the use of an [facility] Irish speaking interpretor.

## 2024-04-21 ENCOUNTER — ANESTHESIA EVENT (OUTPATIENT)
Dept: SURGERY | Facility: HOSPITAL | Age: 84
End: 2024-04-21
Payer: MEDICAID

## 2024-04-22 ENCOUNTER — ANESTHESIA (OUTPATIENT)
Dept: SURGERY | Facility: HOSPITAL | Age: 84
End: 2024-04-22
Payer: MEDICAID

## 2024-04-22 ENCOUNTER — HOSPITAL ENCOUNTER (OUTPATIENT)
Facility: HOSPITAL | Age: 84
Discharge: HOME | End: 2024-04-22
Attending: SURGERY | Admitting: SURGERY
Payer: MEDICAID

## 2024-04-22 VITALS
HEIGHT: 57 IN | SYSTOLIC BLOOD PRESSURE: 121 MMHG | TEMPERATURE: 98 F | HEART RATE: 87 BPM | WEIGHT: 60 LBS | BODY MASS INDEX: 12.95 KG/M2 | RESPIRATION RATE: 16 BRPM | DIASTOLIC BLOOD PRESSURE: 59 MMHG | OXYGEN SATURATION: 99 %

## 2024-04-22 DIAGNOSIS — K55.1 SUPERIOR MESENTERIC ARTERY STENOSIS: ICD-10-CM

## 2024-04-22 LAB
POCT GLUCOSE: 211 MG/DL (ref 70–110)
POCT GLUCOSE: 239 MG/DL (ref 70–110)
POCT GLUCOSE: 250 MG/DL (ref 70–110)

## 2024-04-22 PROCEDURE — 63600175 PHARM REV CODE 636 W HCPCS: Mod: JZ,JG | Performed by: SURGERY

## 2024-04-22 PROCEDURE — 63600175 PHARM REV CODE 636 W HCPCS: Performed by: STUDENT IN AN ORGANIZED HEALTH CARE EDUCATION/TRAINING PROGRAM

## 2024-04-22 PROCEDURE — C1894 INTRO/SHEATH, NON-LASER: HCPCS | Performed by: SURGERY

## 2024-04-22 PROCEDURE — 36000707: Performed by: SURGERY

## 2024-04-22 PROCEDURE — C1769 GUIDE WIRE: HCPCS | Performed by: SURGERY

## 2024-04-22 PROCEDURE — C1887 CATHETER, GUIDING: HCPCS | Performed by: SURGERY

## 2024-04-22 PROCEDURE — D9220A PRA ANESTHESIA: Mod: CRNA,,, | Performed by: NURSE ANESTHETIST, CERTIFIED REGISTERED

## 2024-04-22 PROCEDURE — 27201423 OPTIME MED/SURG SUP & DEVICES STERILE SUPPLY: Performed by: SURGERY

## 2024-04-22 PROCEDURE — 25000003 PHARM REV CODE 250: Performed by: SURGERY

## 2024-04-22 PROCEDURE — 36000706: Performed by: SURGERY

## 2024-04-22 PROCEDURE — 71000015 HC POSTOP RECOV 1ST HR: Performed by: SURGERY

## 2024-04-22 PROCEDURE — 82962 GLUCOSE BLOOD TEST: CPT | Performed by: SURGERY

## 2024-04-22 PROCEDURE — D9220A PRA ANESTHESIA: Mod: ANES,,, | Performed by: STUDENT IN AN ORGANIZED HEALTH CARE EDUCATION/TRAINING PROGRAM

## 2024-04-22 PROCEDURE — 63600175 PHARM REV CODE 636 W HCPCS: Performed by: NURSE ANESTHETIST, CERTIFIED REGISTERED

## 2024-04-22 PROCEDURE — 71000016 HC POSTOP RECOV ADDL HR: Performed by: SURGERY

## 2024-04-22 PROCEDURE — 36245 INS CATH ABD/L-EXT ART 1ST: CPT | Mod: 51,,, | Performed by: SURGERY

## 2024-04-22 PROCEDURE — 25000003 PHARM REV CODE 250: Performed by: STUDENT IN AN ORGANIZED HEALTH CARE EDUCATION/TRAINING PROGRAM

## 2024-04-22 PROCEDURE — 71000044 HC DOSC ROUTINE RECOVERY FIRST HOUR: Performed by: SURGERY

## 2024-04-22 PROCEDURE — 37246 TRLUML BALO ANGIOP 1ST ART: CPT | Mod: ,,, | Performed by: SURGERY

## 2024-04-22 PROCEDURE — 25500020 PHARM REV CODE 255: Performed by: SURGERY

## 2024-04-22 PROCEDURE — 37000008 HC ANESTHESIA 1ST 15 MINUTES: Performed by: SURGERY

## 2024-04-22 PROCEDURE — 25000003 PHARM REV CODE 250: Performed by: NURSE ANESTHETIST, CERTIFIED REGISTERED

## 2024-04-22 PROCEDURE — 37000009 HC ANESTHESIA EA ADD 15 MINS: Performed by: SURGERY

## 2024-04-22 RX ORDER — CLOPIDOGREL BISULFATE 75 MG/1
75 TABLET ORAL DAILY
Qty: 30 TABLET | Refills: 11 | Status: SHIPPED | OUTPATIENT
Start: 2024-04-22 | End: 2025-04-22

## 2024-04-22 RX ORDER — SODIUM CHLORIDE 9 MG/ML
INJECTION, SOLUTION INTRAVENOUS CONTINUOUS
Status: DISCONTINUED | OUTPATIENT
Start: 2024-04-22 | End: 2024-04-22 | Stop reason: HOSPADM

## 2024-04-22 RX ORDER — CLOPIDOGREL BISULFATE 75 MG/1
75 TABLET ORAL DAILY
Status: DISCONTINUED | OUTPATIENT
Start: 2024-04-22 | End: 2024-04-22 | Stop reason: HOSPADM

## 2024-04-22 RX ORDER — LABETALOL HYDROCHLORIDE 5 MG/ML
INJECTION, SOLUTION INTRAVENOUS
Status: DISCONTINUED | OUTPATIENT
Start: 2024-04-22 | End: 2024-04-22

## 2024-04-22 RX ORDER — DEXMEDETOMIDINE HYDROCHLORIDE 100 UG/ML
INJECTION, SOLUTION INTRAVENOUS
Status: DISCONTINUED | OUTPATIENT
Start: 2024-04-22 | End: 2024-04-22

## 2024-04-22 RX ORDER — LIDOCAINE HYDROCHLORIDE 10 MG/ML
INJECTION, SOLUTION EPIDURAL; INFILTRATION; INTRACAUDAL; PERINEURAL
Status: DISCONTINUED | OUTPATIENT
Start: 2024-04-22 | End: 2024-04-22 | Stop reason: HOSPADM

## 2024-04-22 RX ORDER — HEPARIN SODIUM 1000 [USP'U]/ML
INJECTION, SOLUTION INTRAVENOUS; SUBCUTANEOUS
Status: DISCONTINUED | OUTPATIENT
Start: 2024-04-22 | End: 2024-04-22

## 2024-04-22 RX ORDER — PROTAMINE SULFATE 10 MG/ML
INJECTION, SOLUTION INTRAVENOUS
Status: DISCONTINUED | OUTPATIENT
Start: 2024-04-22 | End: 2024-04-22

## 2024-04-22 RX ORDER — CEFAZOLIN SODIUM 1 G/3ML
INJECTION, POWDER, FOR SOLUTION INTRAMUSCULAR; INTRAVENOUS
Status: DISCONTINUED | OUTPATIENT
Start: 2024-04-22 | End: 2024-04-22

## 2024-04-22 RX ORDER — FENTANYL CITRATE 50 UG/ML
INJECTION, SOLUTION INTRAMUSCULAR; INTRAVENOUS
Status: DISCONTINUED | OUTPATIENT
Start: 2024-04-22 | End: 2024-04-22

## 2024-04-22 RX ORDER — VERAPAMIL HYDROCHLORIDE 2.5 MG/ML
INJECTION, SOLUTION INTRAVENOUS
Status: DISCONTINUED | OUTPATIENT
Start: 2024-04-22 | End: 2024-04-22 | Stop reason: HOSPADM

## 2024-04-22 RX ORDER — SODIUM CHLORIDE 9 MG/ML
INJECTION, SOLUTION INTRAVENOUS CONTINUOUS
Status: ACTIVE | OUTPATIENT
Start: 2024-04-22

## 2024-04-22 RX ORDER — NITROGLYCERIN 5 MG/ML
INJECTION, SOLUTION INTRAVENOUS
Status: DISCONTINUED | OUTPATIENT
Start: 2024-04-22 | End: 2024-04-22 | Stop reason: HOSPADM

## 2024-04-22 RX ORDER — HEPARIN SODIUM 1000 [USP'U]/ML
INJECTION, SOLUTION INTRAVENOUS; SUBCUTANEOUS
Status: DISCONTINUED | OUTPATIENT
Start: 2024-04-22 | End: 2024-04-22 | Stop reason: HOSPADM

## 2024-04-22 RX ORDER — IODIXANOL 320 MG/ML
INJECTION, SOLUTION INTRAVASCULAR
Status: DISCONTINUED | OUTPATIENT
Start: 2024-04-22 | End: 2024-04-22 | Stop reason: HOSPADM

## 2024-04-22 RX ADMIN — PROTAMINE SULFATE 25 MG: 10 INJECTION, SOLUTION INTRAVENOUS at 08:04

## 2024-04-22 RX ADMIN — LABETALOL HYDROCHLORIDE 5 MG: 5 INJECTION, SOLUTION INTRAVENOUS at 08:04

## 2024-04-22 RX ADMIN — LABETALOL HYDROCHLORIDE 5 MG: 5 INJECTION, SOLUTION INTRAVENOUS at 07:04

## 2024-04-22 RX ADMIN — HEPARIN SODIUM 3000 UNITS: 1000 INJECTION, SOLUTION INTRAVENOUS; SUBCUTANEOUS at 07:04

## 2024-04-22 RX ADMIN — FENTANYL CITRATE 25 MCG: 50 INJECTION, SOLUTION INTRAMUSCULAR; INTRAVENOUS at 07:04

## 2024-04-22 RX ADMIN — CLOPIDOGREL BISULFATE 75 MG: 75 TABLET ORAL at 10:04

## 2024-04-22 RX ADMIN — DEXMEDETOMIDINE 8 MCG: 100 INJECTION, SOLUTION, CONCENTRATE INTRAVENOUS at 08:04

## 2024-04-22 RX ADMIN — SODIUM CHLORIDE: 0.9 INJECTION, SOLUTION INTRAVENOUS at 07:04

## 2024-04-22 RX ADMIN — CEFAZOLIN 1 G: 330 INJECTION, POWDER, FOR SOLUTION INTRAMUSCULAR; INTRAVENOUS at 07:04

## 2024-04-22 RX ADMIN — INSULIN HUMAN 3 UNITS: 100 INJECTION, SOLUTION PARENTERAL at 09:04

## 2024-04-22 NOTE — TRANSFER OF CARE
"Anesthesia Transfer of Care Note    Patient: Jing Velasquez    Procedure(s) Performed: Procedure(s) (LRB):  PTA, SUPERFICIAL FEMORAL ARTERY (Left)  ANGIOGRAM (Left)    Patient location: PACU    Anesthesia Type: MAC    Transport from OR: Transported from OR on room air with adequate spontaneous ventilation    Post pain: adequate analgesia    Post assessment: no apparent anesthetic complications    Post vital signs: stable    Level of consciousness: awake and alert    Nausea/Vomiting: no nausea/vomiting    Complications: none    Transfer of care protocol was followed      Last vitals: Visit Vitals  /70   Pulse 95   Temp 36.1 °C (97 °F) (Temporal)   Resp 16   Ht 4' 9" (1.448 m)   Wt 27.2 kg (60 lb)   SpO2 98%   Breastfeeding No   BMI 12.98 kg/m²     "

## 2024-04-22 NOTE — PROGRESS NOTES
0935-iv insulin ordered per Dr Turner for glucose 250mg/dl    0950-insulin administered    1015-New Glucose 211.    1020-Dr Turner notified no further orders given.

## 2024-04-22 NOTE — BRIEF OP NOTE
[facility] - Surgery ([facility])  Brief Operative Note    Surgery Date: 4/22/2024     Surgeons and Role:     * MADDI Greenfield III, MD - Primary     * Dale Polk MD - Fellow    Assisting Surgeon: None    Pre-op Diagnosis:  Chronic mesenteric ischemia [K55.1]; severe malnutrition    Post-op Diagnosis: same    PROCEDURES:     PTA, SMA (3.5 x 30)  Selective SMA angiogram  US guided L brachial access    Anesthesia: Local MAC    Operative Findings: 5 fr L brachial/manual compression; 14.5 min fluoro; 334 mGy   75-80% SMA stenosis immediately distal to prior stent, and mid SMA, < 20% after PTA    Estimated Blood Loss: <5cc         Specimens:   Specimen (24h ago, onward)      None              Discharge Note    OUTCOME: successful outpatient procedure     DISPOSITION: home    FINAL DIAGNOSIS:  SMA stenosis, symptomatic    FOLLOWUP:   2 weeks with SMA duplex    DISCHARGE INSTRUCTIONS:  Plavix/ASA (no load), see below

## 2024-04-22 NOTE — ANESTHESIA POSTPROCEDURE EVALUATION
Anesthesia Post Evaluation    Patient: Jing Velasquez    Procedure(s) Performed: Procedure(s) (LRB):  PTA, SUPERFICIAL FEMORAL ARTERY (Left)  ANGIOGRAM (Left)    Final Anesthesia Type: MAC      Patient location during evaluation: PACU  Patient participation: Yes- Able to Participate  Level of consciousness: awake  Post-procedure vital signs: reviewed and stable  Pain management: adequate  Airway patency: patent    PONV status at discharge: No PONV  Anesthetic complications: no      Cardiovascular status: blood pressure returned to baseline  Respiratory status: unassisted, spontaneous ventilation and room air                Vitals Value Taken Time   /58 04/22/24 0906   Temp 36.1 °C (97 °F) 04/22/24 0906   Pulse 83 04/22/24 0906   Resp 16 04/22/24 0906   SpO2 99 % 04/22/24 0906         No case tracking events are documented in the log.      Pain/Cele Score: Cele Score: 7 (4/22/2024  9:04 AM)

## 2024-04-22 NOTE — ANESTHESIA PREPROCEDURE EVALUATION
"              Pre-operative evaluation for Procedure(s) (LRB):  L brachial approach, SMA angioplasty vs stent ANGIOPLASTY-PERCUTANEOUS TRANSLUMINAL (PTA) (Left)    Jing Velasquez is a 84 y.o. Senegalese speaking female w/ HTN, DM2, and hx of SMA stenosis s/p stenting at outside hospital 2-3 years ago who presents to vascular surgery with abdominal pain, weight loss and mesenteric ischemia likely due to recurrent SMA stenosis.       Prev airway: none on record      2D Echo: none on record      EKG (3/2/24):   Vent. Rate : 108 BPM     Atrial Rate : 108 BPM      P-R Int : 138 ms          QRS Dur : 084 ms       QT Int : 330 ms       P-R-T Axes : 065 071 077 degrees      QTc Int : 442 ms     Sinus tachycardia   Otherwise normal ECG       Patient Active Problem List   Diagnosis    Jaw pain    Severe protein-calorie malnutrition    Other chest pain    Hypertension    Type 2 diabetes mellitus, without long-term current use of insulin    Peripheral arterial disease    Pneumonia involving right lung    Abdominal pain    Lower extremity pain    Superior mesenteric artery stenosis       Review of patient's allergies indicates:  No Known Allergies    No past surgical history on file.      Vital Signs:  Temp:  [36.1 °C (97 °F)]   Pulse:  [95]   Resp:  [16]   BP: (160)/(70)   SpO2:  [98 %]       CBC: No results for input(s): "WBC", "RBC", "HGB", "HCT", "PLT", "MCV", "MCH", "MCHC" in the last 72 hours.    CMP: No results for input(s): "NA", "K", "CL", "CO2", "BUN", "CREATININE", "GLU", "MG", "PHOS", "CALCIUM", "ALBUMIN", "PROT", "ALKPHOS", "ALT", "AST", "BILITOT" in the last 72 hours.    INR  No results for input(s): "PT", "INR", "PROTIME", "APTT" in the last 72 hours.                Pre-op Assessment    I have reviewed the Patient Summary Reports.     I have reviewed the Nursing Notes. I have reviewed the NPO Status.   I have reviewed the Medications.     Review of Systems  Anesthesia Hx:  No problems with previous Anesthesia   "           Denies Family Hx of Anesthesia complications.    Denies Personal Hx of Anesthesia complications.                    Hematology/Oncology:    Oncology Normal                                   Cardiovascular:     Hypertension          PVD hyperlipidemia   ECG has been reviewed.                          Pulmonary:  Pulmonary Normal    Denies Asthma.                    Renal/:  Renal/ Normal                 Hepatic/GI:  Hepatic/GI Normal       malnutrition          Neurological:    Denies CVA.                                    Endocrine:  Diabetes               Physical Exam  General: Cachexia    Airway:  Mallampati: II   Mouth Opening: Normal  TM Distance: Normal  Tongue: Normal    Dental:  Periodontal disease    Chest/Lungs:  Clear to auscultation, Normal Respiratory Rate    Heart:  Rate: Normal  Rhythm: Regular Rhythm        Anesthesia Plan  Type of Anesthesia, risks & benefits discussed:    Anesthesia Type: MAC, Gen Natural Airway  Intra-op Monitoring Plan: Standard ASA Monitors  Post Op Pain Control Plan: IV/PO Opioids PRN and multimodal analgesia  Induction:  IV  Informed Consent: Informed consent signed with the Patient and all parties understand the risks and agree with anesthesia plan.  All questions answered.   ASA Score: 3  Day of Surgery Review of History & Physical: H&P Update referred to the surgeon/provider.    Ready For Surgery From Anesthesia Perspective.     .

## 2024-04-22 NOTE — DISCHARGE INSTRUCTIONS
Once you go home, please abide by the following restrictions:    Rest in bed or a recliner for the remainder of the day following the procedure.     You should not go home alone the day of the procedure.    Lifting and activity restrictions depend on the insertion site for the procedure:  - Wrist or hand:   - Do not lift more than 5 pounds for the first 24 hours and do not lift more than 10 pounds with the affected arm for 1 week.   - Do not use affected arm for pulling up in bed, adjusting position, or weight bearing exercise.   - Avoid flexing the wrist, such as hammering, playing tennis, or swinging objects.   - Avoid moisture or submerging hand for 7 days (tub, swimming, dishes). Showering is OK.    Groin:   - No heavy lifting of more than 5 pounds, running, swimming, or strenuous walking for at least 2 days after the angiogram.   - You may return to your usual activity after the two days.   - Do not take a hot bath or shower for at least 12 hours after discharge.    Keep an adhesive bandage on the catheter insertion site for one day to help prevent infection.    Because of the sedation you were given for your procedure, we recommend that you have someone stay with you overnight following your procedure.  - Do not drive a car or operate machinery for the remainder of the day.  - Do not consume any alcoholic beverages for the remainder of the day.  - Postpone signing any important papers or making any important decision for the remainder of the day.  - Do not take any muscle relaxants, sedatives, hypnotics, or mood-altering medication today unless ordered by your physician who is aware you are taking the medication today.    If bleeding occurs:  - Apply manual pressure, and immediately seek medical attention at the nearest hospital.   - Seek immediate medical attention if you experience loss of sensation, redness, swelling or discharge from the insertion site.

## 2024-04-22 NOTE — OP NOTE
[facility] - Surgery (2nd Fl)  Operative Note     Surgery Date: 4/22/2024      Surgeons and Role:     * MADDI Greenfield III, MD - Primary     * Dale Polk MD - Fellow     Assisting Surgeon: None     Pre-op Diagnosis:  Chronic mesenteric ischemia [K55.1]; severe malnutrition     Post-op Diagnosis: same     PROCEDURES:      PTA, SMA (3.5 x 30)  Selective SMA angiogram  US guided L brachial access     Anesthesia: Local MAC     Operative Findings: 5 fr L brachial/manual compression; 14.5 min fluoro; 334 mGy   75-80% SMA stenosis immediately distal to prior stent, and mid SMA, < 20% after PTA     Estimated Blood Loss: <5cc         Specimens:   Specimen (24h ago, onward)        None       Indications:   84-year-old woman with a past medical history significant for chronic mesenteric ischemia postprandial pain and food fear contributing to significant weight loss and severe malnutrition.  She was found to significant SMA stenosis with a peak systolic velocity of 349 distal to previously placed stent.  Patient was consented for aortogram and possible endovascular intervention.      Procedure details:  After obtaining signed consent for the above procedure, the patient was brought to the operating theater and placed in supine position.   Sedation was provided by anesthesia, preoperative antibiotics were administered.  The left arm was prepped and draped in usual sterile fashion.  A surgical time-out was performed confirming patient identity, laterality, and procedure.  Started by identifying the left brachial artery under ultrasound guidance which appeared to be small in caliber but disease-free.  We accessed the brachial artery under ultrasound guidance with a micropuncture needle followed by 0.018 in wire and microcatheter.  A 0.035 in glidewire was advanced into the ascending aorta.  Patient was anticoagulated with 3000 units of IV heparin.  The microcatheter was exchanged for a 5 German sheath.  A pigtail  catheter was advanced into the aortic arch in utilized to direct the wire down the descending aorta.  Our wire was then advanced to the infrarenal aorta 5 Gabonese by 70 cm sheath was advanced to the diaphragm.  A multipurpose catheter was advanced and used to direct our wire into the previously placed stent.  Angiogram was performed which identified stenosis just distal to the previously placed stent proximally 75-80%.  Our wire was exchanged for a choice PT extra-support 0.014 which was advanced into the distal SMA.  The previously identified stenosis was treated with a 3.5 x 30 mm coronary balloon less than 20% stenosis residual.  Wire and sheath were removed, heparin was reversed with protamine, and pressure was held for 20 minutes.  The arteriotomy was noted to be hemostatic without evidence of hematoma formation or ongoing bleeding.  Sterile dressings were applied.  Patient was transported to the PACU in good condition, she tolerated the procedure well.    Dr. Greenfield was present for the entire procedure.    Dale Polk  Vascular Surgery Fellow, PGY-7  [phone]

## 2024-05-02 ENCOUNTER — TELEPHONE (OUTPATIENT)
Dept: VASCULAR SURGERY | Facility: CLINIC | Age: 84
End: 2024-05-02
Payer: MEDICAID

## 2024-05-02 NOTE — TELEPHONE ENCOUNTER
Contacted pt's daughter Dory to schedule appts with assistance of [employer] Malagasy interpretor. Appointment scheduled, Dory verified.   ----- Message from MADDI Greenfield III, MD sent at 4/22/2024 11:31 AM CDT -----  2 week f/u with mesenteric duplex

## 2024-05-03 ENCOUNTER — HOSPITAL ENCOUNTER (OUTPATIENT)
Dept: VASCULAR SURGERY | Facility: CLINIC | Age: 84
Discharge: HOME | End: 2024-05-03
Attending: SURGERY
Payer: MEDICAID

## 2024-05-03 ENCOUNTER — OFFICE VISIT (OUTPATIENT)
Dept: VASCULAR SURGERY | Facility: CLINIC | Age: 84
End: 2024-05-03
Attending: SURGERY
Payer: MEDICAID

## 2024-05-03 VITALS
BODY MASS INDEX: 13.32 KG/M2 | SYSTOLIC BLOOD PRESSURE: 163 MMHG | HEART RATE: 95 BPM | WEIGHT: 61.75 LBS | TEMPERATURE: 98 F | DIASTOLIC BLOOD PRESSURE: 74 MMHG | HEIGHT: 57 IN

## 2024-05-03 DIAGNOSIS — K55.1 SUPERIOR MESENTERIC ARTERY STENOSIS: ICD-10-CM

## 2024-05-03 DIAGNOSIS — K55.1 SUPERIOR MESENTERIC ARTERY STENOSIS: Primary | ICD-10-CM

## 2024-05-03 PROCEDURE — 1159F MED LIST DOCD IN RCRD: CPT | Mod: CPTII,,, | Performed by: SURGERY

## 2024-05-03 PROCEDURE — 1126F AMNT PAIN NOTED NONE PRSNT: CPT | Mod: CPTII,,, | Performed by: SURGERY

## 2024-05-03 PROCEDURE — 3078F DIAST BP <80 MM HG: CPT | Mod: CPTII,,, | Performed by: SURGERY

## 2024-05-03 PROCEDURE — 99999 PR PBB SHADOW E&M-EST. PATIENT-LVL III: CPT | Mod: PBBFAC,,, | Performed by: SURGERY

## 2024-05-03 PROCEDURE — 1160F RVW MEDS BY RX/DR IN RCRD: CPT | Mod: CPTII,,, | Performed by: SURGERY

## 2024-05-03 PROCEDURE — 93975 VASCULAR STUDY: CPT | Mod: 26,S$PBB,, | Performed by: SURGERY

## 2024-05-03 PROCEDURE — 3288F FALL RISK ASSESSMENT DOCD: CPT | Mod: CPTII,,, | Performed by: SURGERY

## 2024-05-03 PROCEDURE — 1101F PT FALLS ASSESS-DOCD LE1/YR: CPT | Mod: CPTII,,, | Performed by: SURGERY

## 2024-05-03 PROCEDURE — 93975 VASCULAR STUDY: CPT | Mod: PBBFAC | Performed by: SURGERY

## 2024-05-03 PROCEDURE — 99214 OFFICE O/P EST MOD 30 MIN: CPT | Mod: S$PBB,,, | Performed by: SURGERY

## 2024-05-03 PROCEDURE — 3077F SYST BP >= 140 MM HG: CPT | Mod: CPTII,,, | Performed by: SURGERY

## 2024-05-03 PROCEDURE — 99213 OFFICE O/P EST LOW 20 MIN: CPT | Mod: PBBFAC,25 | Performed by: SURGERY

## 2024-05-03 NOTE — PROGRESS NOTES
VASCULAR SURGERY SERVICE    CHIEF COMPLAINT:  Chronic mesenteric ischemia     used for this visit    HISTORY OF PRESENT ILLNESS: Jing Velasquez is a 84 y.o. female status [place].   The daughter reports that her postprandial pain resolved after this but has been now Current for at least a year.  She is lost 15 lb over the last year in currently has a BMI of 13, weighs 61 lb (28 kilos).    Through the  states this seems as though she has abdominal pain without eating as well.  Also has some wearing this of her legs and known PAD    The patient is taking aspirin    05/03/2024:   This is her initial postoperative visit after angioplasty of her SMA via percutaneous brachial approach 04/22/2024.  She is found to have significant discrete stenosis distal with the prior stent, as well as moderate to severe mid distal SMA stenosis with significant calcification.  Significant luminal gain was achieved with angioplasty only in due to the very small diameter of her SMA was decided not to stent it.    She reports complete resolution of her postprandial pain which occurred in starting immediately after this intervention.  States compliance with aspirin and Plavix    Past Medical History:   Diagnosis Date    Diabetes mellitus     Hypertension        Past Surgical History:   Procedure Laterality Date    ANGIOGRAPHY Left 4/22/2024    Procedure: ANGIOGRAM;  Surgeon: MADDI Greenfield III, MD;  Location: [facility] OR [address] [place] [address];  Service: Vascular;  Laterality: Left;  Selective SMA angiogram    PTA, SUPERFICIAL FEMORAL ARTERY Left 4/22/2024    Procedure: PTA, SUPERFICIAL FEMORAL ARTERY;  Surgeon: MADDI Greenfield III, MD;  [facility];  Service: Vascular;  Laterality: Left;  14.5 min  334.76 mGy  17.4613 Gy.cm  40ml Dye         Current Outpatient Medications:     aspirin (ECOTRIN) 81 MG EC tablet, Take 1 tablet (81 mg total) by mouth once daily., Disp: 30  "tablet, Rfl: 0    atorvastatin (LIPITOR) 80 MG tablet, Take 1 tablet (80 mg total) by mouth once daily., Disp: 90 tablet, Rfl: 3    carBAMazepine (TEGRETOL) 200 mg tablet, Take 200 mg by mouth once daily., Disp: , Rfl:     clopidogreL (PLAVIX) 75 mg tablet, Take 1 tablet (75 mg total) by mouth once daily., Disp: 30 tablet, Rfl: 11    JARDIANCE 25 mg tablet, Take 25 mg by mouth once daily., Disp: , Rfl:     losartan (COZAAR) 100 MG tablet, Take 1 tablet (100 mg total) by mouth once daily., Disp: 90 tablet, Rfl: 3    megestroL (MEGACE) 400 mg/10 mL (40 mg/mL) Susp, Take 400 mg by mouth once daily., Disp: , Rfl:     metFORMIN (GLUCOPHAGE) 500 MG tablet, Take 500 mg by mouth 2 (two) times daily., Disp: , Rfl:     pantoprazole (PROTONIX) 40 MG tablet, Take 40 mg by mouth once daily., Disp: , Rfl:   No current facility-administered medications for this visit.    Facility-Administered Medications Ordered in Other Visits:     0.9%  NaCl infusion, , Intravenous, Continuous, Hieu Byrnes MD    Review of patient's allergies indicates:  No Known Allergies    No family history on file.    Social History     Tobacco Use    Smoking status: Never    Smokeless tobacco: Never   Substance Use Topics    Alcohol use: No       PHYSICAL EXAM:  BMI 13.3, weight 28 kilos (61 lb)  BP (!) 163/74 (BP Location: Left arm, Patient Position: Sitting, BP Method: Small (Automatic))   Pulse 95   Temp 97.6 °F (36.4 °C) (Oral)   Ht 4' 9" (1.448 m)   Wt 28 kg (61 lb 11.7 oz)   BMI 13.36 kg/m²   Constitutional:  Very frail and cachectic   Neurological: Normal speech  no focal findings  CN II - XII grossly intact.    Psychiatric: Mood and affect appropriate and symmetric.   HEENT: Normocephalic / atraumatic  PERRLA  Midline trachea  No scars across the neck   Cardiac: Regular rate and rhythm.   Pulmonary: Normal pulmonary effort.   Abdomen: Soft, not distended.  Nontender   Skin: Warm and well perfused.    Vascular:  2+ left radial and brachial " pulses.  Stable from preop   Extremities/  Musculoskeletal: No edema.   Arms are very tiny     IMAGING:  SMA duplex today shows a peak systolic velocity in the mid .  However there is elevated velocities throughout her SMA velocity 252 distally.  Pre intervention distal velocities were low and monophasic    CTA from March 2024 was personally reviewed.  There is significant motion artifact.  Proximal SMA stent can be appreciated, the mid SMA is essentially nondiagnostic because of motion artifact      IMPRESSION:  2 weeks status post angioplasty, SMA, and this very very cachectic (28 kg) patient.  Clinically she has had dramatic improvement with resolution of her postprandial pain, despite the fact continued high SMA velocities      PLAN:  Continue aspirin and Plavix  PRN f/u    W. Kumar Greenfield III, MD, FACS  Professor and Chief, Vascular and Endovascular Surgery

## 2024-05-20 ENCOUNTER — OFFICE VISIT (OUTPATIENT)
Dept: INTERNAL MEDICINE | Facility: CLINIC | Age: 84
End: 2024-05-20
Payer: MEDICAID

## 2024-05-20 ENCOUNTER — LAB VISIT (OUTPATIENT)
Dept: LAB | Facility: HOSPITAL | Age: 84
End: 2024-05-20
Attending: INTERNAL MEDICINE
Payer: MEDICAID

## 2024-05-20 VITALS
HEIGHT: 57 IN | WEIGHT: 63.69 LBS | HEART RATE: 86 BPM | OXYGEN SATURATION: 99 % | SYSTOLIC BLOOD PRESSURE: 120 MMHG | DIASTOLIC BLOOD PRESSURE: 70 MMHG | BODY MASS INDEX: 13.74 KG/M2

## 2024-05-20 DIAGNOSIS — R63.4 LOSS OF WEIGHT: ICD-10-CM

## 2024-05-20 DIAGNOSIS — E11.9 TYPE 2 DIABETES MELLITUS WITHOUT COMPLICATION, WITHOUT LONG-TERM CURRENT USE OF INSULIN: ICD-10-CM

## 2024-05-20 DIAGNOSIS — Z78.0 MENOPAUSE: ICD-10-CM

## 2024-05-20 DIAGNOSIS — G50.0 TRIGEMINAL NEURALGIA: ICD-10-CM

## 2024-05-20 DIAGNOSIS — K55.9 MESENTERIC ISCHEMIA: ICD-10-CM

## 2024-05-20 DIAGNOSIS — E11.9 TYPE 2 DIABETES MELLITUS WITHOUT COMPLICATION, WITHOUT LONG-TERM CURRENT USE OF INSULIN: Primary | ICD-10-CM

## 2024-05-20 DIAGNOSIS — K59.00 CONSTIPATION, UNSPECIFIED CONSTIPATION TYPE: ICD-10-CM

## 2024-05-20 LAB
CHOLEST SERPL-MCNC: 209 MG/DL (ref 120–199)
CHOLEST/HDLC SERPL: 4 {RATIO} (ref 2–5)
ESTIMATED AVG GLUCOSE: 266 MG/DL (ref 68–131)
HBA1C MFR BLD: 10.9 % (ref 4–5.6)
HDLC SERPL-MCNC: 52 MG/DL (ref 40–75)
HDLC SERPL: 24.9 % (ref 20–50)
LDLC SERPL CALC-MCNC: 105.6 MG/DL (ref 63–159)
NONHDLC SERPL-MCNC: 157 MG/DL
TRIGL SERPL-MCNC: 257 MG/DL (ref 30–150)

## 2024-05-20 PROCEDURE — 80061 LIPID PANEL: CPT | Performed by: INTERNAL MEDICINE

## 2024-05-20 PROCEDURE — 99214 OFFICE O/P EST MOD 30 MIN: CPT | Mod: PBBFAC,25 | Performed by: INTERNAL MEDICINE

## 2024-05-20 PROCEDURE — 1101F PT FALLS ASSESS-DOCD LE1/YR: CPT | Mod: CPTII,,, | Performed by: INTERNAL MEDICINE

## 2024-05-20 PROCEDURE — 90677 PCV20 VACCINE IM: CPT | Mod: PBBFAC

## 2024-05-20 PROCEDURE — 3078F DIAST BP <80 MM HG: CPT | Mod: CPTII,,, | Performed by: INTERNAL MEDICINE

## 2024-05-20 PROCEDURE — 3074F SYST BP LT 130 MM HG: CPT | Mod: CPTII,,, | Performed by: INTERNAL MEDICINE

## 2024-05-20 PROCEDURE — 1160F RVW MEDS BY RX/DR IN RCRD: CPT | Mod: CPTII,,, | Performed by: INTERNAL MEDICINE

## 2024-05-20 PROCEDURE — 99999 PR PBB SHADOW E&M-EST. PATIENT-LVL IV: CPT | Mod: PBBFAC,,, | Performed by: INTERNAL MEDICINE

## 2024-05-20 PROCEDURE — 99999PBSHW PR PBB SHADOW TECHNICAL ONLY FILED TO HB: Mod: PBBFAC,,,

## 2024-05-20 PROCEDURE — 90471 IMMUNIZATION ADMIN: CPT | Mod: PBBFAC

## 2024-05-20 PROCEDURE — 36415 COLL VENOUS BLD VENIPUNCTURE: CPT | Performed by: INTERNAL MEDICINE

## 2024-05-20 PROCEDURE — 3288F FALL RISK ASSESSMENT DOCD: CPT | Mod: CPTII,,, | Performed by: INTERNAL MEDICINE

## 2024-05-20 PROCEDURE — 83036 HEMOGLOBIN GLYCOSYLATED A1C: CPT | Performed by: INTERNAL MEDICINE

## 2024-05-20 PROCEDURE — 99214 OFFICE O/P EST MOD 30 MIN: CPT | Mod: S$PBB,,, | Performed by: INTERNAL MEDICINE

## 2024-05-20 PROCEDURE — 1159F MED LIST DOCD IN RCRD: CPT | Mod: CPTII,,, | Performed by: INTERNAL MEDICINE

## 2024-05-20 PROCEDURE — 1126F AMNT PAIN NOTED NONE PRSNT: CPT | Mod: CPTII,,, | Performed by: INTERNAL MEDICINE

## 2024-05-20 RX ORDER — ASPIRIN 81 MG/1
81 TABLET ORAL DAILY
Qty: 30 TABLET | Refills: 11 | Status: SHIPPED | OUTPATIENT
Start: 2024-05-20 | End: 2024-06-17 | Stop reason: SDUPTHER

## 2024-05-20 RX ORDER — ATORVASTATIN CALCIUM 80 MG/1
80 TABLET, FILM COATED ORAL DAILY
Qty: 90 TABLET | Refills: 11 | Status: SHIPPED | OUTPATIENT
Start: 2024-05-20

## 2024-05-20 RX ORDER — POLYETHYLENE GLYCOL 3350 17 G/17G
17 POWDER, FOR SOLUTION ORAL DAILY
Qty: 1700 G | Refills: 11 | Status: SHIPPED | OUTPATIENT
Start: 2024-05-20

## 2024-05-20 RX ORDER — EMPAGLIFLOZIN 25 MG/1
25 TABLET, FILM COATED ORAL DAILY
Qty: 90 TABLET | Refills: 11 | Status: SHIPPED | OUTPATIENT
Start: 2024-05-20 | End: 2024-06-17 | Stop reason: SDUPTHER

## 2024-05-20 RX ORDER — METFORMIN HYDROCHLORIDE 500 MG/1
500 TABLET ORAL 2 TIMES DAILY
Qty: 180 TABLET | Refills: 11 | Status: SHIPPED | OUTPATIENT
Start: 2024-05-20

## 2024-05-20 RX ADMIN — PNEUMOCOCCAL 20-VALENT CONJUGATE VACCINE 0.5 ML
2.2; 2.2; 2.2; 2.2; 2.2; 2.2; 2.2; 2.2; 2.2; 2.2; 2.2; 2.2; 2.2; 2.2; 2.2; 2.2; 4.4; 2.2; 2.2; 2.2 INJECTION, SUSPENSION INTRAMUSCULAR at 11:05

## 2024-05-20 NOTE — PROGRESS NOTES
Subjective:       Patient ID: Jing Velasquez is a 84 y.o. female.    Chief Complaint: Medication Refill and Constipation    Patient is here for followup for chronic conditions.    Due to language barrier, an  was present via video during the entire office visit with this patient and her daughter.      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    Eating btr, appetite btr.    Has severe constipation, previous help with tea with herbs/lemon.       Review of Systems   Constitutional:  Positive for activity change and fatigue.   Respiratory:  Positive for shortness of breath.    Cardiovascular:  Negative for chest pain.   Gastrointestinal:  Positive for abdominal pain (improved). Negative for abdominal distention, nausea and vomiting.   Skin:  Negative for wound.   Psychiatric/Behavioral:  Negative for confusion.            Objective:      Physical Exam  Vitals reviewed.   Constitutional:       General: She is not in acute distress.     Appearance: Normal appearance. She is well-developed. She is not ill-appearing, toxic-appearing or diaphoretic.      Comments: Appears underwt    Brighter appearing compared to last visit.  She answers questions when directly asked of her.   HENT:      Head: Normocephalic and atraumatic.   Eyes:      General: No scleral icterus.     Pupils: Pupils are equal, round, and reactive to light.   Neck:      Thyroid: No thyromegaly.   Cardiovascular:      Rate and Rhythm: Normal rate and regular rhythm.      Heart sounds: Normal heart sounds. No murmur heard.     No friction rub. No gallop.   Pulmonary:      Effort: Pulmonary effort is normal. No respiratory distress.      Breath sounds: Normal breath sounds. No wheezing or rales.   Abdominal:      General: Bowel sounds are normal. There is no distension.      Palpations: Abdomen is soft. There is no mass.      Tenderness: There is abdominal tenderness. There is no guarding or rebound.       Comments: Mild nonfocal tenderness to deep palpation     Musculoskeletal:         General: No tenderness. Normal range of motion.      Cervical back: Normal range of motion.      Right lower leg: No edema.      Left lower leg: No edema.   Lymphadenopathy:      Cervical: No cervical adenopathy.   Neurological:      General: No focal deficit present.      Mental Status: She is alert and oriented to person, place, and time.   Psychiatric:         Mood and Affect: Mood normal.         Speech: Speech normal.         Behavior: Behavior normal.         Assessment:       1. Type 2 diabetes mellitus without complication, without long-term current use of insulin    2. Trigeminal neuralgia    3. Menopause    4. Constipation, unspecified constipation type    5. Mesenteric ischemia        Plan:       Jing was seen today for medication refill and constipation.    Diagnoses and all orders for this visit:    Type 2 diabetes mellitus without complication, without long-term current use of insulin  -     Hemoglobin A1C; Future  -     Lipid Panel; Future  -     metFORMIN (GLUCOPHAGE) 500 MG tablet; Take 1 tablet (500 mg total) by mouth 2 (two) times daily.  -     atorvastatin (LIPITOR) 80 MG tablet; Take 1 tablet (80 mg total) by mouth once daily.  -     JARDIANCE 25 mg tablet; Take 1 tablet (25 mg total) by mouth once daily.  Meds refilled.    Trigeminal neuralgia  Medications Discontinued During This Encounter   Medication Reason    carBAMazepine (TEGRETOL) 200 mg tablet                        She has been on medicine for 3 years, I discussed that it is worth stopping the medicine for now and see if she needs it anymore.      Menopause  -     DXA Bone Density Axial Skeleton 1 or more sites; Future    Constipation, unspecified constipation type  -     polyethylene glycol (GLYCOLAX) 17 gram/dose powder; Take 17 g by mouth once daily.    Mesenteric ischemia  -     aspirin (ECOTRIN) 81 MG EC tablet; Take 1 tablet (81 mg total) by mouth  once daily.    Wt loss -- mildly improved.      Health Maintenance         Date Due Completion Date    Lipid Panel Never done ---    Diabetes Urine Screening Never done ---    Pneumococcal Vaccines (Age 65+) (1 of 2 - PCV) Never done ---    Eye Exam Never done ---    TETANUS VACCINE Never done ---    DEXA Scan Never done ---    Shingles Vaccine (1 of 2) Never done ---    RSV Vaccine (Age 60+ and Pregnant patients) (1 - 1-dose 60+ series) Never done ---    COVID-19 Vaccine (1 - 2023-24 season) Never done ---    Hemoglobin A1c 06/04/2024 3/4/2024    Influenza Vaccine (Season Ended) 09/01/2024 ---            Follow up in about 6 months (around 11/20/2024) for Prevanr 20 vaccine and covid vaccines please.    No future appointments.

## 2024-05-22 ENCOUNTER — PATIENT MESSAGE (OUTPATIENT)
Dept: RESEARCH | Facility: HOSPITAL | Age: 84
End: 2024-05-22
Payer: MEDICAID

## 2024-05-24 ENCOUNTER — TELEPHONE (OUTPATIENT)
Dept: INTERNAL MEDICINE | Facility: CLINIC | Age: 84
End: 2024-05-24
Payer: MEDICAID

## 2024-05-24 NOTE — TELEPHONE ENCOUNTER
Hi, please call her daughter who is Filipino-speaking.     Dory Lombardi Daughter [phone]     Her diabetes blood test the hemoglobin A1c is elevated would shows that her diabetes is not well controlled.    Please confirm with the daughter if the patient is taking metformin and Jardiance which refer the diabetes.    I recommend she see a doctor in [place] to continue to help with her diabetes.    Let me know if patient has any questions.  Thank you, Alexandre Farris

## 2024-06-03 PROBLEM — J18.9 PNEUMONIA INVOLVING RIGHT LUNG: Status: RESOLVED | Noted: 2024-03-03 | Resolved: 2024-06-03

## 2024-06-10 ENCOUNTER — PATIENT MESSAGE (OUTPATIENT)
Dept: INTERNAL MEDICINE | Facility: CLINIC | Age: 84
End: 2024-06-10
Payer: MEDICAID

## 2024-06-17 DIAGNOSIS — K55.9 MESENTERIC ISCHEMIA: ICD-10-CM

## 2024-06-17 DIAGNOSIS — E11.9 TYPE 2 DIABETES MELLITUS WITHOUT COMPLICATION, WITHOUT LONG-TERM CURRENT USE OF INSULIN: ICD-10-CM

## 2024-06-17 NOTE — TELEPHONE ENCOUNTER
Refill Routing Note   Medication(s) are not appropriate for processing by [facility] for the following reason(s):        New or recently adjusted medication: Jardiance  Outside of protocol: Aspirin    [facility] action(s):  Defer  Route               Appointments  past 12m or future 3m with PCP    Date Provider   Last Visit   5/20/2024 Alexandre Farris MD   Next Visit   11/25/2024 Alexandre Farris MD   ED visits in past 90 days: 0        Note composed:4:56 PM 06/17/2024

## 2024-06-17 NOTE — TELEPHONE ENCOUNTER
No care due was identified.  [facility] Embedded Care Due Messages. Reference number: [id number].   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

## 2024-06-18 RX ORDER — ASPIRIN 81 MG/1
81 TABLET ORAL DAILY
Qty: 90 TABLET | Refills: 3 | Status: SHIPPED | OUTPATIENT
Start: 2024-06-18

## 2024-06-18 RX ORDER — EMPAGLIFLOZIN 25 MG/1
25 TABLET, FILM COATED ORAL DAILY
Qty: 90 TABLET | Refills: 1 | Status: SHIPPED | OUTPATIENT
Start: 2024-06-18

## 2024-08-31 DIAGNOSIS — K55.9 MESENTERIC ISCHEMIA: ICD-10-CM

## 2024-08-31 DIAGNOSIS — E11.9 TYPE 2 DIABETES MELLITUS WITHOUT COMPLICATION, WITHOUT LONG-TERM CURRENT USE OF INSULIN: ICD-10-CM

## 2024-08-31 DIAGNOSIS — K59.00 CONSTIPATION, UNSPECIFIED CONSTIPATION TYPE: ICD-10-CM

## 2024-08-31 NOTE — TELEPHONE ENCOUNTER
Care Due:                  Date            Visit Type   Department     Provider  --------------------------------------------------------------------------------                                EP -                              PRIMARY      [facility] INTERNAL  Last Visit: 05-      CARE ([facility])   MEDICINE       Alexandre Farris                              EP -                              PRIMARY      [facility] INTERNAL  Next Visit: 11-      CARE ([facility])   MEDICINE       Alexandre Farris                                                            Last  Test          Frequency    Reason                     Performed    Due Date  --------------------------------------------------------------------------------    HBA1C.......  6 months...  JEREMYDIANCE, metFORMIN.....  05- 11-    [facility] Embedded Care Due Messages. Reference number: [id number].   8/31/2024 6:00:34 PM CDT

## 2024-09-03 RX ORDER — EMPAGLIFLOZIN 25 MG/1
25 TABLET, FILM COATED ORAL DAILY
Qty: 90 TABLET | Refills: 0 | Status: SHIPPED | OUTPATIENT
Start: 2024-09-03

## 2024-09-03 RX ORDER — POLYETHYLENE GLYCOL 3350 17 G/17G
17 POWDER, FOR SOLUTION ORAL DAILY
Qty: 1700 G | Refills: 11 | Status: SHIPPED | OUTPATIENT
Start: 2024-09-03

## 2024-09-03 RX ORDER — ATORVASTATIN CALCIUM 80 MG/1
80 TABLET, FILM COATED ORAL DAILY
Qty: 90 TABLET | Refills: 1 | Status: SHIPPED | OUTPATIENT
Start: 2024-09-03

## 2024-09-03 RX ORDER — ASPIRIN 81 MG/1
81 TABLET ORAL DAILY
Qty: 90 TABLET | Refills: 3 | Status: SHIPPED | OUTPATIENT
Start: 2024-09-03

## 2024-09-03 RX ORDER — METFORMIN HYDROCHLORIDE 500 MG/1
500 TABLET ORAL 2 TIMES DAILY
Qty: 180 TABLET | Refills: 0 | Status: SHIPPED | OUTPATIENT
Start: 2024-09-03

## 2024-09-03 NOTE — TELEPHONE ENCOUNTER
Refill Routing Note   Medication(s) are not appropriate for processing by [facility] for the following reason(s):        Outside of protocol    [facility] action(s):  Route  Approve     Requires labs : Yes             Appointments  past 12m or future 3m with PCP    Date Provider   Last Visit   5/20/2024 Alexandre Farris MD   Next Visit   11/25/2024 Alexandre Farris MD   ED visits in past 90 days: 0        Note composed:9:48 AM 09/03/2024

## 2024-11-26 ENCOUNTER — PATIENT MESSAGE (OUTPATIENT)
Dept: ADMINISTRATIVE | Facility: HOSPITAL | Age: 84
End: 2024-11-26
Payer: MEDICAID

## 2025-02-25 ENCOUNTER — PATIENT MESSAGE (OUTPATIENT)
Dept: ADMINISTRATIVE | Facility: HOSPITAL | Age: 85
End: 2025-02-25
Payer: MEDICAID

## (undated) DEVICE — WIRE CHOICE PT X SUPP 014X300

## (undated) DEVICE — SYS LABEL CORRECT MED

## (undated) DEVICE — VISE RADIFOCUS MULTI TORQUE

## (undated) DEVICE — CATH SEEKER .035IN 90CM

## (undated) DEVICE — CATH ANGIO PGTL 5F .035 65CM

## (undated) DEVICE — CATH ANGIO EXPO MULTIPURP 5FR

## (undated) DEVICE — FLOWSWITCH HP 1-W W/O LL

## (undated) DEVICE — DECANTER FLUID TRNSF WHITE 9IN

## (undated) DEVICE — Device

## (undated) DEVICE — KIT INTRO MICRO NIT VSI 4FR

## (undated) DEVICE — COVER INSTR ELASTIC BAND 40X20

## (undated) DEVICE — VALVE CONTROL COPILOT

## (undated) DEVICE — GUIDEWIRE STF .035X260CM ANG

## (undated) DEVICE — INTRODUCER VASC RADPQ 5FRX10CM

## (undated) DEVICE — SOL NS 1000CC

## (undated) DEVICE — INFLATOR ENCORE

## (undated) DEVICE — TUBING HIGH PRESSURE

## (undated) DEVICE — SYR MED RAD 150ML

## (undated) DEVICE — DRAPE U SPLIT SHEET 54X76IN

## (undated) DEVICE — INTRODUCER 5FR 70CM